# Patient Record
Sex: FEMALE | Race: BLACK OR AFRICAN AMERICAN | NOT HISPANIC OR LATINO | Employment: UNEMPLOYED | URBAN - METROPOLITAN AREA
[De-identification: names, ages, dates, MRNs, and addresses within clinical notes are randomized per-mention and may not be internally consistent; named-entity substitution may affect disease eponyms.]

---

## 2017-10-06 ENCOUNTER — HISTORICAL (OUTPATIENT)
Dept: ADMINISTRATIVE | Facility: HOSPITAL | Age: 32
End: 2017-10-06

## 2017-10-06 LAB
BILIRUB SERPL-MCNC: NEGATIVE MG/DL
BLOOD URINE, POC: NORMAL
CLARITY, POC UA: NORMAL
COLOR, POC UA: YELLOW
GLUCOSE UR QL STRIP: NEGATIVE
KETONES UR QL STRIP: NEGATIVE
LEUKOCYTE EST, POC UA: NORMAL
NITRITE, POC UA: NEGATIVE
PH, POC UA: 6
POC BETA-HCG (QUAL): NEGATIVE
PROTEIN, POC: NORMAL
SPECIFIC GRAVITY, POC UA: NORMAL
UROBILINOGEN, POC UA: NORMAL

## 2017-12-20 ENCOUNTER — NURSE TRIAGE (OUTPATIENT)
Dept: ADMINISTRATIVE | Facility: CLINIC | Age: 32
End: 2017-12-20

## 2017-12-20 NOTE — TELEPHONE ENCOUNTER
Reason for Disposition   [1] Caller requesting NON-URGENT health information AND [2] PCP's office is the best resource    Protocols used: ST INFORMATION ONLY CALL-A-AH    Pt mom states that pt started taking antibiotics on Saturday for pneumonia. States that pt is having chills and sweating but does not have fever. Pt PCP is not with [facility]. Advised pt mom to call pt PCP for care advice.

## 2018-01-04 ENCOUNTER — HISTORICAL (OUTPATIENT)
Dept: ADMINISTRATIVE | Facility: HOSPITAL | Age: 33
End: 2018-01-04

## 2018-02-06 ENCOUNTER — HISTORICAL (OUTPATIENT)
Dept: ADMINISTRATIVE | Facility: HOSPITAL | Age: 33
End: 2018-02-06

## 2018-02-06 LAB
ABS NEUT (OLG): 5.24 X10(3)/MCL (ref 2.1–9.2)
ALBUMIN SERPL-MCNC: 3.7 GM/DL (ref 3.4–5)
ALBUMIN/GLOB SERPL: 0.9 RATIO (ref 1.1–2)
ALP SERPL-CCNC: 120 UNIT/L (ref 38–126)
ALT SERPL-CCNC: 13 UNIT/L (ref 12–78)
AST SERPL-CCNC: 9 UNIT/L (ref 15–37)
BASOPHILS # BLD AUTO: 0 X10(3)/MCL (ref 0–0.2)
BASOPHILS NFR BLD AUTO: 0 %
BILIRUB SERPL-MCNC: 0.3 MG/DL (ref 0.2–1)
BILIRUBIN DIRECT+TOT PNL SERPL-MCNC: 0 MG/DL (ref 0–0.5)
BILIRUBIN DIRECT+TOT PNL SERPL-MCNC: 0.3 MG/DL (ref 0–0.8)
BUN SERPL-MCNC: 11 MG/DL (ref 7–18)
CALCIUM SERPL-MCNC: 9.1 MG/DL (ref 8.5–10.1)
CHLORIDE SERPL-SCNC: 103 MMOL/L (ref 98–107)
CHOLEST SERPL-MCNC: 201 MG/DL (ref 0–200)
CHOLEST/HDLC SERPL: 4.8 {RATIO} (ref 0–4)
CO2 SERPL-SCNC: 24 MMOL/L (ref 21–32)
CREAT SERPL-MCNC: 0.5 MG/DL (ref 0.55–1.02)
CREAT UR-MCNC: 199 MG/DL
EOSINOPHIL # BLD AUTO: 0.1 X10(3)/MCL (ref 0–0.9)
EOSINOPHIL NFR BLD AUTO: 1 %
ERYTHROCYTE [DISTWIDTH] IN BLOOD BY AUTOMATED COUNT: 16 % (ref 11.5–17)
EST. AVERAGE GLUCOSE BLD GHB EST-MCNC: 148 MG/DL
GLOBULIN SER-MCNC: 4 GM/DL (ref 2.4–3.5)
GLUCOSE SERPL-MCNC: 104 MG/DL (ref 74–106)
HBA1C MFR BLD: 6.8 % (ref 4.2–6.3)
HCT VFR BLD AUTO: 35.3 % (ref 37–47)
HDLC SERPL-MCNC: 42 MG/DL (ref 35–60)
HGB BLD-MCNC: 10.8 GM/DL (ref 12–16)
LDLC SERPL CALC-MCNC: 133 MG/DL (ref 0–129)
LYMPHOCYTES # BLD AUTO: 3.1 X10(3)/MCL (ref 0.6–4.6)
LYMPHOCYTES NFR BLD AUTO: 33 %
MCH RBC QN AUTO: 24.3 PG (ref 27–31)
MCHC RBC AUTO-ENTMCNC: 30.6 GM/DL (ref 33–36)
MCV RBC AUTO: 79.3 FL (ref 80–94)
MICROALBUMIN UR-MCNC: 10 MG/DL
MICROALBUMIN/CREAT RATIO PNL UR: 50.3 MG/GM CR (ref 0–30)
MONOCYTES # BLD AUTO: 0.9 X10(3)/MCL (ref 0.1–1.3)
MONOCYTES NFR BLD AUTO: 10 %
NEUTROPHILS # BLD AUTO: 5.24 X10(3)/MCL (ref 2.1–9.2)
NEUTROPHILS NFR BLD AUTO: 55 %
PLATELET # BLD AUTO: 464 X10(3)/MCL (ref 130–400)
PMV BLD AUTO: 8.6 FL (ref 9.4–12.4)
POTASSIUM SERPL-SCNC: 3.9 MMOL/L (ref 3.5–5.1)
PROT SERPL-MCNC: 7.7 GM/DL (ref 6.4–8.2)
RBC # BLD AUTO: 4.45 X10(6)/MCL (ref 4.2–5.4)
SODIUM SERPL-SCNC: 137 MMOL/L (ref 136–145)
T3FREE SERPL-MCNC: 2.57 PG/ML (ref 2.18–3.98)
T4 FREE SERPL-MCNC: 0.84 NG/DL (ref 0.76–1.46)
TRIGL SERPL-MCNC: 128 MG/DL (ref 30–150)
TSH SERPL-ACNC: 2.2 MIU/ML (ref 0.36–3.74)
VLDLC SERPL CALC-MCNC: 26 MG/DL
WBC # SPEC AUTO: 9.5 X10(3)/MCL (ref 4.5–11.5)

## 2018-05-14 ENCOUNTER — HISTORICAL (OUTPATIENT)
Dept: ADMINISTRATIVE | Facility: HOSPITAL | Age: 33
End: 2018-05-14

## 2018-05-14 LAB — HIV 1+2 AB+HIV1 P24 AG SERPL QL IA: NONREACTIVE

## 2018-06-12 ENCOUNTER — HISTORICAL (OUTPATIENT)
Dept: ADMINISTRATIVE | Facility: HOSPITAL | Age: 33
End: 2018-06-12

## 2018-06-12 LAB
ABS NEUT (OLG): 5.56 X10(3)/MCL (ref 2.1–9.2)
ALBUMIN SERPL-MCNC: 3.5 GM/DL (ref 3.4–5)
ALBUMIN/GLOB SERPL: 0.9 {RATIO}
ALP SERPL-CCNC: 118 UNIT/L (ref 38–126)
ALT SERPL-CCNC: 24 UNIT/L (ref 12–78)
AST SERPL-CCNC: 8 UNIT/L (ref 15–37)
BASOPHILS # BLD AUTO: 0 X10(3)/MCL (ref 0–0.2)
BASOPHILS NFR BLD AUTO: 0 %
BILIRUB SERPL-MCNC: 0.3 MG/DL (ref 0.2–1)
BILIRUBIN DIRECT+TOT PNL SERPL-MCNC: 0.1 MG/DL (ref 0–0.2)
BILIRUBIN DIRECT+TOT PNL SERPL-MCNC: 0.2 MG/DL (ref 0–0.8)
BUN SERPL-MCNC: 8 MG/DL (ref 7–18)
CALCIUM SERPL-MCNC: 8.9 MG/DL (ref 8.5–10.1)
CHLORIDE SERPL-SCNC: 107 MMOL/L (ref 98–107)
CHOLEST SERPL-MCNC: 174 MG/DL (ref 0–200)
CHOLEST/HDLC SERPL: 5.3 {RATIO} (ref 0–4)
CO2 SERPL-SCNC: 22 MMOL/L (ref 21–32)
CREAT SERPL-MCNC: 0.72 MG/DL (ref 0.55–1.02)
CREAT UR-MCNC: 210 MG/DL
EOSINOPHIL # BLD AUTO: 0.1 X10(3)/MCL (ref 0–0.9)
EOSINOPHIL NFR BLD AUTO: 2 %
ERYTHROCYTE [DISTWIDTH] IN BLOOD BY AUTOMATED COUNT: 16.5 % (ref 11.5–17)
EST. AVERAGE GLUCOSE BLD GHB EST-MCNC: 140 MG/DL
GLOBULIN SER-MCNC: 4 GM/DL (ref 2.4–3.5)
GLUCOSE SERPL-MCNC: 94 MG/DL (ref 74–106)
HBA1C MFR BLD: 6.5 % (ref 4.2–6.3)
HCT VFR BLD AUTO: 35.5 % (ref 37–47)
HDLC SERPL-MCNC: 33 MG/DL (ref 35–60)
HGB BLD-MCNC: 10.2 GM/DL (ref 12–16)
LDLC SERPL CALC-MCNC: 115 MG/DL (ref 0–129)
LYMPHOCYTES # BLD AUTO: 2.4 X10(3)/MCL (ref 0.6–4.6)
LYMPHOCYTES NFR BLD AUTO: 26 %
MCH RBC QN AUTO: 23.5 PG (ref 27–31)
MCHC RBC AUTO-ENTMCNC: 28.7 GM/DL (ref 33–36)
MCV RBC AUTO: 81.8 FL (ref 80–94)
MICROALBUMIN UR-MCNC: 2.6 MG/DL
MICROALBUMIN/CREAT RATIO PNL UR: 12.4 MG/GM CR (ref 0–30)
MONOCYTES # BLD AUTO: 0.8 X10(3)/MCL (ref 0.1–1.3)
MONOCYTES NFR BLD AUTO: 8 %
NEUTROPHILS # BLD AUTO: 5.56 X10(3)/MCL (ref 2.1–9.2)
NEUTROPHILS NFR BLD AUTO: 62 %
PLATELET # BLD AUTO: 565 X10(3)/MCL (ref 130–400)
PMV BLD AUTO: 8.8 FL (ref 9.4–12.4)
POTASSIUM SERPL-SCNC: 3.6 MMOL/L (ref 3.5–5.1)
PROT SERPL-MCNC: 7.5 GM/DL (ref 6.4–8.2)
RBC # BLD AUTO: 4.34 X10(6)/MCL (ref 4.2–5.4)
SODIUM SERPL-SCNC: 140 MMOL/L (ref 136–145)
TRIGL SERPL-MCNC: 131 MG/DL (ref 30–150)
VIT B12 SERPL-MCNC: 722 PG/ML (ref 193–986)
VLDLC SERPL CALC-MCNC: 26 MG/DL
WBC # SPEC AUTO: 8.9 X10(3)/MCL (ref 4.5–11.5)

## 2018-08-13 ENCOUNTER — HISTORICAL (OUTPATIENT)
Dept: ADMINISTRATIVE | Facility: HOSPITAL | Age: 33
End: 2018-08-13

## 2018-08-13 LAB
ABS NEUT (OLG): 4.4 X10(3)/MCL (ref 2.1–9.2)
ALBUMIN SERPL-MCNC: 4 GM/DL (ref 3.4–5)
ALBUMIN/GLOB SERPL: 1 RATIO (ref 1.1–2)
ALP SERPL-CCNC: 124 UNIT/L (ref 38–126)
ALT SERPL-CCNC: 23 UNIT/L (ref 12–78)
ANISOCYTOSIS BLD QL SMEAR: 1
APPEARANCE, UA: CLEAR
AST SERPL-CCNC: 14 UNIT/L (ref 15–37)
BACTERIA #/AREA URNS AUTO: ABNORMAL /HPF
BASOPHILS # BLD AUTO: 0 X10(3)/MCL (ref 0–0.2)
BASOPHILS NFR BLD AUTO: 0 %
BILIRUB SERPL-MCNC: 0.2 MG/DL (ref 0.2–1)
BILIRUB UR QL STRIP: NEGATIVE
BILIRUBIN DIRECT+TOT PNL SERPL-MCNC: 0.1 MG/DL (ref 0–0.5)
BILIRUBIN DIRECT+TOT PNL SERPL-MCNC: 0.1 MG/DL (ref 0–0.8)
BUN SERPL-MCNC: 8 MG/DL (ref 7–18)
CALCIUM SERPL-MCNC: 9.3 MG/DL (ref 8.5–10.1)
CHLORIDE SERPL-SCNC: 103 MMOL/L (ref 98–107)
CHOLEST SERPL-MCNC: 200 MG/DL (ref 0–200)
CHOLEST/HDLC SERPL: 5.3 {RATIO} (ref 0–4)
CO2 SERPL-SCNC: 23 MMOL/L (ref 21–32)
COLOR UR: ABNORMAL
CREAT SERPL-MCNC: 0.82 MG/DL (ref 0.55–1.02)
EOSINOPHIL # BLD AUTO: 0 X10(3)/MCL (ref 0–0.9)
EOSINOPHIL NFR BLD AUTO: 0 %
ERYTHROCYTE [DISTWIDTH] IN BLOOD BY AUTOMATED COUNT: 16.8 % (ref 11.5–17)
GLOBULIN SER-MCNC: 4.2 GM/DL (ref 2.4–3.5)
GLUCOSE (UA): NEGATIVE
GLUCOSE SERPL-MCNC: 115 MG/DL (ref 74–106)
HCT VFR BLD AUTO: 36.6 % (ref 37–47)
HDLC SERPL-MCNC: 38 MG/DL (ref 35–60)
HGB BLD-MCNC: 10.7 GM/DL (ref 12–16)
HGB UR QL STRIP: NEGATIVE
KETONES UR QL STRIP: ABNORMAL
LDLC SERPL CALC-MCNC: 137 MG/DL (ref 0–129)
LEUKOCYTE ESTERASE UR QL STRIP: NEGATIVE
LYMPHOCYTES # BLD AUTO: 2.7 X10(3)/MCL (ref 0.6–4.6)
LYMPHOCYTES NFR BLD AUTO: 33 % (ref 13–40)
MCH RBC QN AUTO: 22.1 PG (ref 27–31)
MCHC RBC AUTO-ENTMCNC: 29.2 GM/DL (ref 33–36)
MCV RBC AUTO: 75.5 FL (ref 80–94)
MONOCYTES # BLD AUTO: 1 X10(3)/MCL (ref 0.1–1.3)
MONOCYTES NFR BLD AUTO: 12 %
NEUTROPHILS # BLD AUTO: 4.4 X10(3)/MCL (ref 2.1–9.2)
NEUTROPHILS NFR BLD AUTO: 54 %
NITRITE UR QL STRIP.AUTO: NEGATIVE
PH UR STRIP: 6 [PH] (ref 5–9)
PLATELET # BLD AUTO: 620 X10(3)/MCL (ref 130–400)
PLATELET # BLD EST: NORMAL 10*3/UL
PMV BLD AUTO: 8.6 FL (ref 7.4–10.4)
POTASSIUM SERPL-SCNC: 3.5 MMOL/L (ref 3.5–5.1)
PROT SERPL-MCNC: 8.2 GM/DL (ref 6.4–8.2)
PROT UR QL STRIP: ABNORMAL
RBC # BLD AUTO: 4.85 X10(6)/MCL (ref 4.2–5.4)
RBC #/AREA URNS HPF: ABNORMAL /[HPF]
RHEUMATOID FACT SERPL-ACNC: <10 IU/ML (ref 0–15)
SODIUM SERPL-SCNC: 140 MMOL/L (ref 136–145)
SP GR UR STRIP: 1.03 (ref 1–1.03)
SQUAMOUS EPITHELIAL, UA: 10 /HPF (ref 0–4)
TRIGL SERPL-MCNC: 125 MG/DL (ref 30–150)
UROBILINOGEN UR STRIP-ACNC: 1
VLDLC SERPL CALC-MCNC: 25 MG/DL
WBC # SPEC AUTO: 8.2 X10(3)/MCL (ref 4.5–11.5)
WBC #/AREA URNS AUTO: ABNORMAL /HPF (ref 0–3)

## 2018-11-01 ENCOUNTER — HISTORICAL (OUTPATIENT)
Dept: RADIOLOGY | Facility: HOSPITAL | Age: 33
End: 2018-11-01

## 2018-12-07 ENCOUNTER — TELEPHONE (OUTPATIENT)
Dept: OTOLARYNGOLOGY | Facility: CLINIC | Age: 33
End: 2018-12-07

## 2018-12-07 NOTE — TELEPHONE ENCOUNTER
----- Message from Lorraine Edwards sent at 12/7/2018  3:24 PM CST -----  Contact: self  Pt is calling in regards to rescheduling her appt that was missed on 11/30. Books aren't currently open to schedule this appt. Pt would like a call back to do so.    She can be reached at [phone].    Thank you

## 2018-12-20 ENCOUNTER — HISTORICAL (OUTPATIENT)
Dept: ADMINISTRATIVE | Facility: HOSPITAL | Age: 33
End: 2018-12-20

## 2019-01-03 ENCOUNTER — HOSPITAL ENCOUNTER (OUTPATIENT)
Dept: MEDSURG UNIT | Facility: HOSPITAL | Age: 34
End: 2019-01-05
Attending: SURGERY | Admitting: SURGERY

## 2019-01-04 LAB
ABS NEUT (OLG): 8.68 X10(3)/MCL (ref 2.1–9.2)
ALBUMIN SERPL-MCNC: 3.6 GM/DL (ref 3.4–5)
ALBUMIN/GLOB SERPL: 0.8 {RATIO}
ALP SERPL-CCNC: 123 UNIT/L (ref 38–126)
ALT SERPL-CCNC: 19 UNIT/L (ref 12–78)
ANISOCYTOSIS BLD QL SMEAR: 1
APPEARANCE, UA: ABNORMAL
AST SERPL-CCNC: 9 UNIT/L (ref 15–37)
BACTERIA SPEC CULT: ABNORMAL /HPF
BILIRUB SERPL-MCNC: 0.5 MG/DL (ref 0.2–1)
BILIRUB UR QL STRIP: NEGATIVE
BILIRUBIN DIRECT+TOT PNL SERPL-MCNC: 0.1 MG/DL (ref 0–0.2)
BILIRUBIN DIRECT+TOT PNL SERPL-MCNC: 0.4 MG/DL (ref 0–0.8)
BUN SERPL-MCNC: 10 MG/DL (ref 7–18)
BURR CELLS BLD QL SMEAR: 1
CALCIUM SERPL-MCNC: 8.8 MG/DL (ref 8.5–10.1)
CHLORIDE SERPL-SCNC: 102 MMOL/L (ref 98–107)
CO2 SERPL-SCNC: 25 MMOL/L (ref 21–32)
COLOR UR: YELLOW
CREAT SERPL-MCNC: 0.72 MG/DL (ref 0.55–1.02)
EOSINOPHIL NFR BLD MANUAL: 3 % (ref 0–8)
ERYTHROCYTE [DISTWIDTH] IN BLOOD BY AUTOMATED COUNT: 17.5 % (ref 11.5–17)
GLOBULIN SER-MCNC: 4.7 GM/DL (ref 2.4–3.5)
GLUCOSE (UA): NEGATIVE
GLUCOSE SERPL-MCNC: 97 MG/DL (ref 74–106)
HCT VFR BLD AUTO: 39.2 % (ref 37–47)
HGB BLD-MCNC: 11.4 GM/DL (ref 12–16)
HGB UR QL STRIP: NEGATIVE
HYPOCHROMIA BLD QL SMEAR: 0
KETONES UR QL STRIP: NEGATIVE
LACTATE SERPL-SCNC: 0.6 MMOL/L (ref 0.4–2)
LEUKOCYTE ESTERASE UR QL STRIP: NEGATIVE
LIPASE SERPL-CCNC: 192 UNIT/L (ref 73–393)
LYMPHOCYTES NFR BLD MANUAL: 33 % (ref 13–40)
MACROCYTES BLD QL SMEAR: 0
MCH RBC QN AUTO: 21.3 PG (ref 27–31)
MCHC RBC AUTO-ENTMCNC: 29.1 GM/DL (ref 33–36)
MCV RBC AUTO: 73.3 FL (ref 80–94)
MICROCYTES BLD QL SMEAR: 1
MONOCYTES NFR BLD MANUAL: 7 % (ref 2–11)
NEUTROPHILS NFR BLD MANUAL: 57 % (ref 47–80)
NITRITE UR QL STRIP: NEGATIVE
PH UR STRIP: 6.5 [PH] (ref 5–9)
PLATELET # BLD AUTO: 614 X10(3)/MCL (ref 130–400)
PLATELET # BLD EST: ABNORMAL 10*3/UL
PMV BLD AUTO: 8.6 FL (ref 7.4–10.4)
POIKILOCYTOSIS BLD QL SMEAR: 1
POLYCHROMASIA BLD QL SMEAR: 0
POTASSIUM SERPL-SCNC: 3.3 MMOL/L (ref 3.5–5.1)
PROT SERPL-MCNC: 8.3 GM/DL (ref 6.4–8.2)
PROT UR QL STRIP: ABNORMAL
RBC # BLD AUTO: 5.35 X10(6)/MCL (ref 4.2–5.4)
RBC #/AREA URNS HPF: ABNORMAL /[HPF]
SODIUM SERPL-SCNC: 135 MMOL/L (ref 136–145)
SP GR UR STRIP: 1.03 (ref 1–1.03)
SQUAMOUS EPITHELIAL, UA: 6 /HPF (ref 0–4)
UROBILINOGEN UR STRIP-ACNC: 1
WBC # SPEC AUTO: 13.8 X10(3)/MCL (ref 4.5–11.5)
WBC #/AREA URNS HPF: ABNORMAL /[HPF]

## 2019-01-05 LAB
ABS NEUT (OLG): 5.61 X10(3)/MCL (ref 2.1–9.2)
ALBUMIN SERPL-MCNC: 2.6 GM/DL (ref 3.4–5)
ALBUMIN/GLOB SERPL: 0.6 RATIO (ref 1.1–2)
ALP SERPL-CCNC: 104 UNIT/L (ref 38–126)
ALT SERPL-CCNC: 14 UNIT/L (ref 12–78)
AST SERPL-CCNC: 10 UNIT/L (ref 15–37)
BASOPHILS # BLD AUTO: 0 X10(3)/MCL (ref 0–0.2)
BASOPHILS NFR BLD AUTO: 0 %
BILIRUB SERPL-MCNC: 0.2 MG/DL (ref 0.2–1)
BILIRUBIN DIRECT+TOT PNL SERPL-MCNC: 0 MG/DL (ref 0–0.5)
BILIRUBIN DIRECT+TOT PNL SERPL-MCNC: 0.2 MG/DL (ref 0–0.8)
BUN SERPL-MCNC: 4 MG/DL (ref 7–18)
CALCIUM SERPL-MCNC: 8.4 MG/DL (ref 8.5–10.1)
CHLORIDE SERPL-SCNC: 105 MMOL/L (ref 98–107)
CO2 SERPL-SCNC: 23 MMOL/L (ref 21–32)
CREAT SERPL-MCNC: 0.55 MG/DL (ref 0.55–1.02)
EOSINOPHIL # BLD AUTO: 0.1 X10(3)/MCL (ref 0–0.9)
EOSINOPHIL NFR BLD AUTO: 1 %
ERYTHROCYTE [DISTWIDTH] IN BLOOD BY AUTOMATED COUNT: 17.6 % (ref 11.5–17)
GLOBULIN SER-MCNC: 4.2 GM/DL (ref 2.4–3.5)
GLUCOSE SERPL-MCNC: 103 MG/DL (ref 74–106)
HCT VFR BLD AUTO: 32.3 % (ref 37–47)
HGB BLD-MCNC: 9.3 GM/DL (ref 12–16)
LYMPHOCYTES # BLD AUTO: 3.2 X10(3)/MCL (ref 0.6–4.6)
LYMPHOCYTES NFR BLD AUTO: 32 %
MCH RBC QN AUTO: 21.3 PG (ref 27–31)
MCHC RBC AUTO-ENTMCNC: 28.8 GM/DL (ref 33–36)
MCV RBC AUTO: 73.9 FL (ref 80–94)
MONOCYTES # BLD AUTO: 1.1 X10(3)/MCL (ref 0.1–1.3)
MONOCYTES NFR BLD AUTO: 11 %
NEUTROPHILS # BLD AUTO: 5.61 X10(3)/MCL (ref 2.1–9.2)
NEUTROPHILS NFR BLD AUTO: 56 %
PLATELET # BLD AUTO: 488 X10(3)/MCL (ref 130–400)
PMV BLD AUTO: 8.7 FL (ref 9.4–12.4)
POTASSIUM SERPL-SCNC: 3.2 MMOL/L (ref 3.5–5.1)
PROT SERPL-MCNC: 6.8 GM/DL (ref 6.4–8.2)
RBC # BLD AUTO: 4.37 X10(6)/MCL (ref 4.2–5.4)
SODIUM SERPL-SCNC: 137 MMOL/L (ref 136–145)
WBC # SPEC AUTO: 10 X10(3)/MCL (ref 4.5–11.5)

## 2019-01-21 ENCOUNTER — OFFICE VISIT (OUTPATIENT)
Dept: OTOLARYNGOLOGY | Facility: CLINIC | Age: 34
End: 2019-01-21
Payer: MEDICAID

## 2019-01-21 VITALS
WEIGHT: 197.56 LBS | SYSTOLIC BLOOD PRESSURE: 139 MMHG | DIASTOLIC BLOOD PRESSURE: 85 MMHG | BODY MASS INDEX: 36.35 KG/M2 | HEART RATE: 118 BPM | HEIGHT: 62 IN

## 2019-01-21 DIAGNOSIS — K21.9 LPRD (LARYNGOPHARYNGEAL REFLUX DISEASE): Primary | ICD-10-CM

## 2019-01-21 PROCEDURE — 31575 PR LARYNGOSCOPY, FLEXIBLE; DIAGNOSTIC: ICD-10-PCS | Mod: S$PBB,,, | Performed by: OTOLARYNGOLOGY

## 2019-01-21 PROCEDURE — 99213 OFFICE O/P EST LOW 20 MIN: CPT | Mod: PBBFAC | Performed by: OTOLARYNGOLOGY

## 2019-01-21 PROCEDURE — 99204 OFFICE O/P NEW MOD 45 MIN: CPT | Mod: 25,S$PBB,, | Performed by: OTOLARYNGOLOGY

## 2019-01-21 PROCEDURE — 31575 DIAGNOSTIC LARYNGOSCOPY: CPT | Mod: S$PBB,,, | Performed by: OTOLARYNGOLOGY

## 2019-01-21 PROCEDURE — 99204 PR OFFICE/OUTPT VISIT, NEW, LEVL IV, 45-59 MIN: ICD-10-PCS | Mod: 25,S$PBB,, | Performed by: OTOLARYNGOLOGY

## 2019-01-21 PROCEDURE — 99999 PR PBB SHADOW E&M-EST. PATIENT-LVL III: CPT | Mod: PBBFAC,,, | Performed by: OTOLARYNGOLOGY

## 2019-01-21 PROCEDURE — 99999 PR PBB SHADOW E&M-EST. PATIENT-LVL III: ICD-10-PCS | Mod: PBBFAC,,, | Performed by: OTOLARYNGOLOGY

## 2019-01-21 PROCEDURE — 31575 DIAGNOSTIC LARYNGOSCOPY: CPT | Mod: PBBFAC | Performed by: OTOLARYNGOLOGY

## 2019-01-22 NOTE — CONSULTS
Ms. Glass presents for consultation.    VITAL SIGNS:  Per nurses' notes.    CHIEF COMPLAINT:  Breathing issues.    HISTORY OF PRESENT ILLNESS:  This is a 33-year-old black female who states she   has restrictive lung disease and a positive globus type sensation.  She states   she occasionally has some blood in the morning when she blows her nose.  She   denies any nasal obstruction.    REVIEW OF SYSTEMS:  CONSTITUTIONAL: Weight loss or weight gain: Negative.  ALLERGY/IMMUNOLOGIC: Negative.  ENT/Mouth:  Hearing Loss/Dizziness/Tinnitus: Negative.  Ear Infections/Otalgia: Negative.  Rhinitis/Sinusitis/Epistaxis: Negative.  Headache/Facial Pain: Negative.  Nasal Obstruction/Snoring/SAIDA: Negative.  Throat: Infections/Pain: Negative.  Hoarseness/Speech Disturbance: Negative.  Salivary Glands Disorder: Negative.  Trauma: Hx: Negative.    Cardiovascular:  MI/Angina: Negative.  Hypertension: Negative.  Endo: DM/Steroids: Negative.  Eyes: Negative.  GI: Dysphagia/Reflux: Negative.  : GYN Pregnancy: Negative.      Renal: Dialysis: Negative.  Lymph: Neck Mass/Lymphadenopathy: Negative.  Musculoskeletal: Negative.  Hem: Bleeding Disorders/Anemia: Negative.  Neuro: Cranial/Neuralgia: Negative.  Pulm: Asthma/SOB/Cough: Negative.  Skin/Breast: Negative.    PAST MEDICAL/FAMILY/SOCIAL HISTORY:    Past Medical History   ENT Surgery: Negative.   Occupational Exposure: Negative.    Problems: Negative.   Cancer: Negative.   Positive for bipolar I disorder, COPD, diabetes and GERD.  Surgical history   positive for hip surgery, cholecystectomy, .    Past Family History   Family history hearing loss: Negative.   Family history cancer: Negative.    Past Social History   Tobacco: Nonsmoker.   Alcohol: Nondrinker.    MEDICATIONS:  Per MedCard.    ALLERGIES:  Per MedCard.    EXAMINATION:  General Appearance:  Well-developed, well-nourished, heavyset 33-year-old black   female in no apparent distress.  Communication  Ability:  Good.  EARS, NOSE, THROAT, MOUTH;  EARS:   External auditory canals: Show cerumen impaction.   Hearing: Grossly Intact.   Tympanic membranes: Clear.  NOSE:   External: Normal.  Good valvular support.   Intranasal: Septum is straight.  Her mucosa is slightly dry.  No areas of   obvious bleeding here.  Turbinates 1+.  Airway clear.  MOUTH:   Intraorally: Lips, teeth and gums: Normal.   Oropharynx: Normal.   Mucosa: Normal.  THROAT:   Tongue: Normal.   Palate: Normal.   Tonsils: 1-2+.   Posterior pharynx: Normal.  HEAD/FACE INSPECTION: Normal and atraumatic.   Palpation/Percussion:  Non tender.   Facial Strength: Normal and symmetric.   Salivary glands: Normal.    NECK: Supple.  THYROID: No masses.  LYMPHATICS: No nodes.  RESPIRATORY:   Effort: Normal.  EYES:   Ocular Mobility: Normal.   Vision: Grossly intact.  NEURO/PSYCH:   Cranial nerves: 2-12 grossly intact.   Orientation: x3.   Mood/Affect: Normal.    On flexible fiberoptic exam with scope #[id number], she shows no polyps, masses or   other lesions intranasally.  Epiglottis is normal.  Aryepiglottic folds do show   erythema and edema consistent with LPR.  Her vocal cords are normal with normal   mobility.  No masses or lesions.  She is nontender to palpation.    IMPRESSION:  A 33-year-old black female with diagnosis of chronic obstructive   lung disease, also with GERD and laryngopharyngeal reflux.    RECOMMENDATION:  I have discussed my findings with her in detail as well as my   recommendations for treatment.  I have given her reflux precautions on written   information.  I have also suggested GI consultation.  She will continue with her   Prilosec and return to clinic here p.r.n.      HG/HN  dd: 01/21/2019 14:45:32 (CST)  td: 01/21/2019 23:40:26 (CST)  Doc ID   #[id number]  Job ID #[id number]    CC:

## 2019-05-07 PROBLEM — E61.1 IRON DEFICIENCY: Status: ACTIVE | Noted: 2019-05-07

## 2019-06-04 LAB
HPV, HIGH-RISK: POSITIVE
HPV, HIGH-RISK: POSITIVE

## 2019-08-28 ENCOUNTER — HISTORICAL (OUTPATIENT)
Dept: ADMINISTRATIVE | Facility: HOSPITAL | Age: 34
End: 2019-08-28

## 2019-10-30 ENCOUNTER — HISTORICAL (OUTPATIENT)
Dept: ADMINISTRATIVE | Facility: HOSPITAL | Age: 34
End: 2019-10-30

## 2019-12-04 ENCOUNTER — HISTORICAL (OUTPATIENT)
Dept: ADMINISTRATIVE | Facility: HOSPITAL | Age: 34
End: 2019-12-04

## 2021-03-19 ENCOUNTER — IMMUNIZATION (OUTPATIENT)
Dept: FAMILY MEDICINE | Facility: CLINIC | Age: 36
End: 2021-03-19

## 2021-03-19 DIAGNOSIS — Z23 NEED FOR VACCINATION: Primary | ICD-10-CM

## 2021-03-19 PROCEDURE — 0001A COVID-19, MRNA, LNP-S, PF, 30 MCG/0.3 ML DOSE VACCINE: CPT | Mod: CV19,S$GLB,, | Performed by: FAMILY MEDICINE

## 2021-03-19 PROCEDURE — 91300 COVID-19, MRNA, LNP-S, PF, 30 MCG/0.3 ML DOSE VACCINE: ICD-10-PCS | Mod: S$GLB,,, | Performed by: FAMILY MEDICINE

## 2021-03-19 PROCEDURE — 91300 COVID-19, MRNA, LNP-S, PF, 30 MCG/0.3 ML DOSE VACCINE: CPT | Mod: S$GLB,,, | Performed by: FAMILY MEDICINE

## 2021-03-19 PROCEDURE — 0001A COVID-19, MRNA, LNP-S, PF, 30 MCG/0.3 ML DOSE VACCINE: ICD-10-PCS | Mod: CV19,S$GLB,, | Performed by: FAMILY MEDICINE

## 2021-03-22 LAB
HCV AB SERPL QL IA: NEGATIVE
HIV AB/AG COMBO RFLX CONF: NON REACTIVE

## 2021-03-24 ENCOUNTER — HOSPITAL ENCOUNTER (EMERGENCY)
Facility: HOSPITAL | Age: 36
Discharge: HOME | End: 2021-03-24
Attending: SURGERY
Payer: MEDICAID

## 2021-03-24 VITALS
WEIGHT: 188.06 LBS | OXYGEN SATURATION: 98 % | DIASTOLIC BLOOD PRESSURE: 73 MMHG | HEART RATE: 106 BPM | RESPIRATION RATE: 18 BRPM | BODY MASS INDEX: 34.4 KG/M2 | TEMPERATURE: 99 F | SYSTOLIC BLOOD PRESSURE: 118 MMHG

## 2021-03-24 DIAGNOSIS — F43.9 STRESS: ICD-10-CM

## 2021-03-24 DIAGNOSIS — R07.89 ATYPICAL CHEST PAIN: Primary | ICD-10-CM

## 2021-03-24 LAB
ALBUMIN SERPL BCP-MCNC: 3.3 G/DL (ref 3.5–5.2)
ALP SERPL-CCNC: 81 U/L (ref 55–135)
ALT SERPL W/O P-5'-P-CCNC: 12 U/L (ref 10–44)
AMPHET+METHAMPHET UR QL: NEGATIVE
ANION GAP SERPL CALC-SCNC: 9 MMOL/L (ref 8–16)
APTT BLDCRRT: 27.1 SEC (ref 21–32)
AST SERPL-CCNC: 8 U/L (ref 10–40)
B-HCG UR QL: NEGATIVE
BARBITURATES UR QL SCN>200 NG/ML: NEGATIVE
BASOPHILS # BLD AUTO: 0.06 K/UL (ref 0–0.2)
BASOPHILS NFR BLD: 0.6 % (ref 0–1.9)
BENZODIAZ UR QL SCN>200 NG/ML: NORMAL
BILIRUB SERPL-MCNC: 0.1 MG/DL (ref 0.1–1)
BILIRUB UR QL STRIP: NEGATIVE
BNP SERPL-MCNC: 10 PG/ML (ref 0–99)
BUN SERPL-MCNC: 10 MG/DL (ref 6–20)
BZE UR QL SCN: NEGATIVE
CALCIUM SERPL-MCNC: 8.9 MG/DL (ref 8.7–10.5)
CANNABINOIDS UR QL SCN: NEGATIVE
CHLORIDE SERPL-SCNC: 107 MMOL/L (ref 95–110)
CK MB SERPL-MCNC: 0.9 NG/ML (ref 0.1–6.5)
CK MB SERPL-RTO: 1.4 % (ref 0–5)
CK SERPL-CCNC: 66 U/L (ref 20–180)
CK SERPL-CCNC: 66 U/L (ref 20–180)
CLARITY UR: CLEAR
CO2 SERPL-SCNC: 22 MMOL/L (ref 23–29)
COLOR UR: YELLOW
CREAT SERPL-MCNC: 0.6 MG/DL (ref 0.5–1.4)
CREAT UR-MCNC: 85.5 MG/DL (ref 15–325)
D DIMER PPP IA.FEU-MCNC: 0.84 MG/L FEU
DIFFERENTIAL METHOD: ABNORMAL
EOSINOPHIL # BLD AUTO: 0.1 K/UL (ref 0–0.5)
EOSINOPHIL NFR BLD: 1.4 % (ref 0–8)
ERYTHROCYTE [DISTWIDTH] IN BLOOD BY AUTOMATED COUNT: 14.7 % (ref 11.5–14.5)
EST. GFR  (AFRICAN AMERICAN): >60 ML/MIN/1.73 M^2
EST. GFR  (NON AFRICAN AMERICAN): >60 ML/MIN/1.73 M^2
GLUCOSE SERPL-MCNC: 95 MG/DL (ref 70–110)
GLUCOSE UR QL STRIP: NEGATIVE
HCT VFR BLD AUTO: 36.1 % (ref 37–48.5)
HGB BLD-MCNC: 11 G/DL (ref 12–16)
HGB UR QL STRIP: NEGATIVE
IMM GRANULOCYTES # BLD AUTO: 0.1 K/UL (ref 0–0.04)
IMM GRANULOCYTES NFR BLD AUTO: 1 % (ref 0–0.5)
INR PPP: 0.9 (ref 0.8–1.2)
KETONES UR QL STRIP: NEGATIVE
LEUKOCYTE ESTERASE UR QL STRIP: NEGATIVE
LYMPHOCYTES # BLD AUTO: 2.7 K/UL (ref 1–4.8)
LYMPHOCYTES NFR BLD: 26.5 % (ref 18–48)
MCH RBC QN AUTO: 25.2 PG (ref 27–31)
MCHC RBC AUTO-ENTMCNC: 30.5 G/DL (ref 32–36)
MCV RBC AUTO: 83 FL (ref 82–98)
METHADONE UR QL SCN>300 NG/ML: NEGATIVE
MONOCYTES # BLD AUTO: 1.1 K/UL (ref 0.3–1)
MONOCYTES NFR BLD: 10.6 % (ref 4–15)
NEUTROPHILS # BLD AUTO: 6.1 K/UL (ref 1.8–7.7)
NEUTROPHILS NFR BLD: 59.9 % (ref 38–73)
NITRITE UR QL STRIP: NEGATIVE
NRBC BLD-RTO: 0 /100 WBC
OPIATES UR QL SCN: NEGATIVE
PCP UR QL SCN>25 NG/ML: NEGATIVE
PH UR STRIP: 7 [PH] (ref 5–8)
PLATELET # BLD AUTO: 504 K/UL (ref 150–350)
PMV BLD AUTO: 8.9 FL (ref 9.2–12.9)
POTASSIUM SERPL-SCNC: 4.1 MMOL/L (ref 3.5–5.1)
PROT SERPL-MCNC: 7.5 G/DL (ref 6–8.4)
PROT UR QL STRIP: NEGATIVE
PROTHROMBIN TIME: 9.9 SEC (ref 9–12.5)
RBC # BLD AUTO: 4.36 M/UL (ref 4–5.4)
SODIUM SERPL-SCNC: 138 MMOL/L (ref 136–145)
SP GR UR STRIP: >=1.03 (ref 1–1.03)
TOXICOLOGY INFORMATION: NORMAL
TROPONIN I SERPL DL<=0.01 NG/ML-MCNC: <0.006 NG/ML (ref 0–0.03)
URN SPEC COLLECT METH UR: ABNORMAL
UROBILINOGEN UR STRIP-ACNC: NEGATIVE EU/DL
WBC # BLD AUTO: 10.19 K/UL (ref 3.9–12.7)

## 2021-03-24 PROCEDURE — 85730 THROMBOPLASTIN TIME PARTIAL: CPT | Performed by: SURGERY

## 2021-03-24 PROCEDURE — 85025 COMPLETE CBC W/AUTO DIFF WBC: CPT | Performed by: SURGERY

## 2021-03-24 PROCEDURE — 83880 ASSAY OF NATRIURETIC PEPTIDE: CPT | Performed by: SURGERY

## 2021-03-24 PROCEDURE — 25000003 PHARM REV CODE 250: Performed by: SURGERY

## 2021-03-24 PROCEDURE — 82553 CREATINE MB FRACTION: CPT | Performed by: SURGERY

## 2021-03-24 PROCEDURE — 81003 URINALYSIS AUTO W/O SCOPE: CPT | Mod: 59 | Performed by: SURGERY

## 2021-03-24 PROCEDURE — 93010 EKG 12-LEAD: ICD-10-PCS | Mod: ,,, | Performed by: INTERNAL MEDICINE

## 2021-03-24 PROCEDURE — 85379 FIBRIN DEGRADATION QUANT: CPT | Performed by: SURGERY

## 2021-03-24 PROCEDURE — 99285 EMERGENCY DEPT VISIT HI MDM: CPT | Mod: 25

## 2021-03-24 PROCEDURE — 93010 ELECTROCARDIOGRAM REPORT: CPT | Mod: ,,, | Performed by: INTERNAL MEDICINE

## 2021-03-24 PROCEDURE — 96360 HYDRATION IV INFUSION INIT: CPT

## 2021-03-24 PROCEDURE — 80053 COMPREHEN METABOLIC PANEL: CPT | Performed by: SURGERY

## 2021-03-24 PROCEDURE — 93005 ELECTROCARDIOGRAM TRACING: CPT

## 2021-03-24 PROCEDURE — 81025 URINE PREGNANCY TEST: CPT | Performed by: SURGERY

## 2021-03-24 PROCEDURE — 80307 DRUG TEST PRSMV CHEM ANLYZR: CPT | Performed by: SURGERY

## 2021-03-24 PROCEDURE — 85610 PROTHROMBIN TIME: CPT | Performed by: SURGERY

## 2021-03-24 PROCEDURE — 82550 ASSAY OF CK (CPK): CPT | Performed by: SURGERY

## 2021-03-24 PROCEDURE — 84484 ASSAY OF TROPONIN QUANT: CPT | Performed by: SURGERY

## 2021-03-24 PROCEDURE — 25000003 PHARM REV CODE 250: Performed by: FAMILY MEDICINE

## 2021-03-24 PROCEDURE — 25500020 PHARM REV CODE 255: Performed by: FAMILY MEDICINE

## 2021-03-24 PROCEDURE — 36415 COLL VENOUS BLD VENIPUNCTURE: CPT | Performed by: SURGERY

## 2021-03-24 RX ORDER — CYCLOBENZAPRINE HCL 10 MG
10 TABLET ORAL 3 TIMES DAILY PRN
Qty: 15 TABLET | Refills: 0 | Status: SHIPPED | OUTPATIENT
Start: 2021-03-24 | End: 2021-06-16 | Stop reason: SDUPTHER

## 2021-03-24 RX ORDER — CYCLOBENZAPRINE HCL 10 MG
10 TABLET ORAL
Status: COMPLETED | OUTPATIENT
Start: 2021-03-24 | End: 2021-03-24

## 2021-03-24 RX ORDER — TRAMADOL HYDROCHLORIDE 50 MG/1
50 TABLET ORAL EVERY 6 HOURS PRN
Qty: 12 TABLET | Refills: 0 | Status: SHIPPED | OUTPATIENT
Start: 2021-03-24 | End: 2022-11-03

## 2021-03-24 RX ORDER — TRAMADOL HYDROCHLORIDE 50 MG/1
50 TABLET ORAL
Status: COMPLETED | OUTPATIENT
Start: 2021-03-24 | End: 2021-03-24

## 2021-03-24 RX ORDER — ASPIRIN 325 MG
325 TABLET ORAL
Status: COMPLETED | OUTPATIENT
Start: 2021-03-24 | End: 2021-03-24

## 2021-03-24 RX ADMIN — CYCLOBENZAPRINE 10 MG: 10 TABLET, FILM COATED ORAL at 06:03

## 2021-03-24 RX ADMIN — ASPIRIN 325 MG: 325 TABLET ORAL at 05:03

## 2021-03-24 RX ADMIN — IOHEXOL 75 ML: 350 INJECTION, SOLUTION INTRAVENOUS at 06:03

## 2021-03-24 RX ADMIN — TRAMADOL HYDROCHLORIDE 50 MG: 50 TABLET ORAL at 06:03

## 2021-03-24 RX ADMIN — SODIUM CHLORIDE 500 ML: 0.9 INJECTION, SOLUTION INTRAVENOUS at 05:03

## 2021-03-26 DIAGNOSIS — R07.89 CHEST WALL PAIN: Primary | ICD-10-CM

## 2021-04-09 ENCOUNTER — IMMUNIZATION (OUTPATIENT)
Dept: FAMILY MEDICINE | Facility: CLINIC | Age: 36
End: 2021-04-09

## 2021-04-09 DIAGNOSIS — Z23 NEED FOR VACCINATION: Primary | ICD-10-CM

## 2021-04-09 PROCEDURE — 91300 COVID-19, MRNA, LNP-S, PF, 30 MCG/0.3 ML DOSE VACCINE: CPT | Mod: S$GLB,,, | Performed by: FAMILY MEDICINE

## 2021-04-09 PROCEDURE — 0002A COVID-19, MRNA, LNP-S, PF, 30 MCG/0.3 ML DOSE VACCINE: CPT | Mod: CV19,S$GLB,, | Performed by: FAMILY MEDICINE

## 2021-04-09 PROCEDURE — 0002A COVID-19, MRNA, LNP-S, PF, 30 MCG/0.3 ML DOSE VACCINE: ICD-10-PCS | Mod: CV19,S$GLB,, | Performed by: FAMILY MEDICINE

## 2021-04-09 PROCEDURE — 91300 COVID-19, MRNA, LNP-S, PF, 30 MCG/0.3 ML DOSE VACCINE: ICD-10-PCS | Mod: S$GLB,,, | Performed by: FAMILY MEDICINE

## 2021-05-05 ENCOUNTER — OFFICE VISIT (OUTPATIENT)
Dept: FAMILY MEDICINE | Facility: CLINIC | Age: 36
End: 2021-05-05
Payer: MEDICAID

## 2021-05-05 VITALS
DIASTOLIC BLOOD PRESSURE: 81 MMHG | HEIGHT: 62 IN | SYSTOLIC BLOOD PRESSURE: 126 MMHG | BODY MASS INDEX: 35.46 KG/M2 | RESPIRATION RATE: 18 BRPM | WEIGHT: 192.69 LBS | HEART RATE: 99 BPM

## 2021-05-05 DIAGNOSIS — E11.65 TYPE 2 DIABETES MELLITUS WITH HYPERGLYCEMIA, WITHOUT LONG-TERM CURRENT USE OF INSULIN: ICD-10-CM

## 2021-05-05 DIAGNOSIS — E78.5 HYPERLIPIDEMIA, UNSPECIFIED HYPERLIPIDEMIA TYPE: ICD-10-CM

## 2021-05-05 DIAGNOSIS — F41.9 ANXIETY: ICD-10-CM

## 2021-05-05 DIAGNOSIS — J30.2 SEASONAL ALLERGIES: ICD-10-CM

## 2021-05-05 DIAGNOSIS — I10 ESSENTIAL HYPERTENSION: ICD-10-CM

## 2021-05-05 DIAGNOSIS — K59.00 CONSTIPATION, UNSPECIFIED CONSTIPATION TYPE: ICD-10-CM

## 2021-05-05 DIAGNOSIS — E61.1 IRON DEFICIENCY: Primary | ICD-10-CM

## 2021-05-05 DIAGNOSIS — K21.9 GASTROESOPHAGEAL REFLUX DISEASE, UNSPECIFIED WHETHER ESOPHAGITIS PRESENT: ICD-10-CM

## 2021-05-05 PROCEDURE — 99999 PR PBB SHADOW E&M-EST. PATIENT-LVL III: ICD-10-PCS | Mod: PBBFAC,,, | Performed by: STUDENT IN AN ORGANIZED HEALTH CARE EDUCATION/TRAINING PROGRAM

## 2021-05-05 PROCEDURE — 99999 PR PBB SHADOW E&M-EST. PATIENT-LVL III: CPT | Mod: PBBFAC,,, | Performed by: STUDENT IN AN ORGANIZED HEALTH CARE EDUCATION/TRAINING PROGRAM

## 2021-05-05 PROCEDURE — 99204 PR OFFICE/OUTPT VISIT, NEW, LEVL IV, 45-59 MIN: ICD-10-PCS | Mod: S$PBB,,, | Performed by: STUDENT IN AN ORGANIZED HEALTH CARE EDUCATION/TRAINING PROGRAM

## 2021-05-05 PROCEDURE — 99204 OFFICE O/P NEW MOD 45 MIN: CPT | Mod: S$PBB,,, | Performed by: STUDENT IN AN ORGANIZED HEALTH CARE EDUCATION/TRAINING PROGRAM

## 2021-05-05 PROCEDURE — 99213 OFFICE O/P EST LOW 20 MIN: CPT | Mod: PBBFAC | Performed by: STUDENT IN AN ORGANIZED HEALTH CARE EDUCATION/TRAINING PROGRAM

## 2021-05-05 RX ORDER — PANTOPRAZOLE SODIUM 40 MG/1
40 TABLET, DELAYED RELEASE ORAL DAILY
Qty: 30 TABLET | Refills: 5 | Status: SHIPPED | OUTPATIENT
Start: 2021-05-05 | End: 2021-12-07

## 2021-05-05 RX ORDER — BLOOD SUGAR DIAGNOSTIC
STRIP MISCELLANEOUS
Qty: 100 STRIP | Refills: 11 | Status: SHIPPED | OUTPATIENT
Start: 2021-05-05 | End: 2022-05-19 | Stop reason: SDUPTHER

## 2021-05-05 RX ORDER — EMPAGLIFLOZIN AND LINAGLIPTIN 25; 5 MG/1; MG/1
TABLET, FILM COATED ORAL
Qty: 30 TABLET | Refills: 5 | Status: SHIPPED | OUTPATIENT
Start: 2021-05-05 | End: 2021-12-12

## 2021-05-05 RX ORDER — BUSPIRONE HYDROCHLORIDE 15 MG/1
15 TABLET ORAL 3 TIMES DAILY
Qty: 90 TABLET | Refills: 5 | Status: SHIPPED | OUTPATIENT
Start: 2021-05-05 | End: 2021-12-28 | Stop reason: SDUPTHER

## 2021-05-05 RX ORDER — ATORVASTATIN CALCIUM 20 MG/1
20 TABLET, FILM COATED ORAL NIGHTLY
Qty: 30 TABLET | Refills: 5 | Status: SHIPPED | OUTPATIENT
Start: 2021-05-05 | End: 2021-06-16

## 2021-05-05 RX ORDER — SPIRONOLACTONE 100 MG/1
100 TABLET, FILM COATED ORAL DAILY
Qty: 30 TABLET | Refills: 5 | Status: SHIPPED | OUTPATIENT
Start: 2021-05-05 | End: 2021-12-12

## 2021-05-05 RX ORDER — INSULIN PUMP SYRINGE, 3 ML
EACH MISCELLANEOUS
Qty: 1 EACH | Refills: 0 | Status: SHIPPED | OUTPATIENT
Start: 2021-05-05 | End: 2023-09-05 | Stop reason: SDUPTHER

## 2021-05-05 RX ORDER — CETIRIZINE HYDROCHLORIDE 10 MG/1
TABLET ORAL
Qty: 30 TABLET | Refills: 5 | Status: SHIPPED | OUTPATIENT
Start: 2021-05-05 | End: 2021-12-12

## 2021-05-05 RX ORDER — BUSPIRONE HYDROCHLORIDE 15 MG/1
15 TABLET ORAL
COMMUNITY
End: 2021-05-05 | Stop reason: SDUPTHER

## 2021-05-05 RX ORDER — PROMETHAZINE HYDROCHLORIDE AND DEXTROMETHORPHAN HYDROBROMIDE 6.25; 15 MG/5ML; MG/5ML
5 SYRUP ORAL EVERY 8 HOURS PRN
Qty: 180 ML | Refills: 0 | Status: SHIPPED | OUTPATIENT
Start: 2021-05-05 | End: 2021-07-23 | Stop reason: SDUPTHER

## 2021-05-05 RX ORDER — METFORMIN HYDROCHLORIDE 500 MG/1
500 TABLET ORAL 2 TIMES DAILY WITH MEALS
Qty: 60 TABLET | Refills: 0 | Status: SHIPPED | OUTPATIENT
Start: 2021-05-05 | End: 2021-06-01

## 2021-06-09 ENCOUNTER — TELEPHONE (OUTPATIENT)
Dept: FAMILY MEDICINE | Facility: CLINIC | Age: 36
End: 2021-06-09

## 2021-06-11 ENCOUNTER — LAB VISIT (OUTPATIENT)
Dept: LAB | Facility: HOSPITAL | Age: 36
End: 2021-06-11
Attending: STUDENT IN AN ORGANIZED HEALTH CARE EDUCATION/TRAINING PROGRAM
Payer: MEDICAID

## 2021-06-11 DIAGNOSIS — E61.1 IRON DEFICIENCY: ICD-10-CM

## 2021-06-11 DIAGNOSIS — E11.65 TYPE 2 DIABETES MELLITUS WITH HYPERGLYCEMIA, WITHOUT LONG-TERM CURRENT USE OF INSULIN: ICD-10-CM

## 2021-06-11 DIAGNOSIS — E78.5 HYPERLIPIDEMIA, UNSPECIFIED HYPERLIPIDEMIA TYPE: ICD-10-CM

## 2021-06-11 LAB
ALBUMIN SERPL BCP-MCNC: 4.1 G/DL (ref 3.5–5.2)
ALP SERPL-CCNC: 117 U/L (ref 55–135)
ALT SERPL W/O P-5'-P-CCNC: 37 U/L (ref 10–44)
ANION GAP SERPL CALC-SCNC: 10 MMOL/L (ref 8–16)
AST SERPL-CCNC: 16 U/L (ref 10–40)
BASOPHILS # BLD AUTO: 0.06 K/UL (ref 0–0.2)
BASOPHILS NFR BLD: 0.7 % (ref 0–1.9)
BILIRUB SERPL-MCNC: 0.5 MG/DL (ref 0.1–1)
BUN SERPL-MCNC: 10 MG/DL (ref 6–20)
CALCIUM SERPL-MCNC: 10.1 MG/DL (ref 8.7–10.5)
CHLORIDE SERPL-SCNC: 101 MMOL/L (ref 95–110)
CHOLEST SERPL-MCNC: 235 MG/DL (ref 120–199)
CHOLEST/HDLC SERPL: 5.3 {RATIO} (ref 2–5)
CO2 SERPL-SCNC: 25 MMOL/L (ref 23–29)
CREAT SERPL-MCNC: 0.8 MG/DL (ref 0.5–1.4)
DIFFERENTIAL METHOD: ABNORMAL
EOSINOPHIL # BLD AUTO: 0.1 K/UL (ref 0–0.5)
EOSINOPHIL NFR BLD: 0.6 % (ref 0–8)
ERYTHROCYTE [DISTWIDTH] IN BLOOD BY AUTOMATED COUNT: 16.1 % (ref 11.5–14.5)
EST. GFR  (AFRICAN AMERICAN): >60 ML/MIN/1.73 M^2
EST. GFR  (NON AFRICAN AMERICAN): >60 ML/MIN/1.73 M^2
GLUCOSE SERPL-MCNC: 107 MG/DL (ref 70–110)
HCT VFR BLD AUTO: 44.6 % (ref 37–48.5)
HDLC SERPL-MCNC: 44 MG/DL (ref 40–75)
HDLC SERPL: 18.7 % (ref 20–50)
HGB BLD-MCNC: 13.5 G/DL (ref 12–16)
IMM GRANULOCYTES # BLD AUTO: 0.05 K/UL (ref 0–0.04)
IMM GRANULOCYTES NFR BLD AUTO: 0.6 % (ref 0–0.5)
LDLC SERPL CALC-MCNC: 158.2 MG/DL (ref 63–159)
LYMPHOCYTES # BLD AUTO: 2.7 K/UL (ref 1–4.8)
LYMPHOCYTES NFR BLD: 31.9 % (ref 18–48)
MCH RBC QN AUTO: 23.8 PG (ref 27–31)
MCHC RBC AUTO-ENTMCNC: 30.3 G/DL (ref 32–36)
MCV RBC AUTO: 79 FL (ref 82–98)
MONOCYTES # BLD AUTO: 0.9 K/UL (ref 0.3–1)
MONOCYTES NFR BLD: 10.9 % (ref 4–15)
NEUTROPHILS # BLD AUTO: 4.6 K/UL (ref 1.8–7.7)
NEUTROPHILS NFR BLD: 55.3 % (ref 38–73)
NONHDLC SERPL-MCNC: 191 MG/DL
NRBC BLD-RTO: 0 /100 WBC
PLATELET # BLD AUTO: 584 K/UL (ref 150–450)
PMV BLD AUTO: 8.9 FL (ref 9.2–12.9)
POTASSIUM SERPL-SCNC: 3.3 MMOL/L (ref 3.5–5.1)
PROT SERPL-MCNC: 9 G/DL (ref 6–8.4)
RBC # BLD AUTO: 5.68 M/UL (ref 4–5.4)
SODIUM SERPL-SCNC: 136 MMOL/L (ref 136–145)
TRIGL SERPL-MCNC: 164 MG/DL (ref 30–150)
WBC # BLD AUTO: 8.35 K/UL (ref 3.9–12.7)

## 2021-06-11 PROCEDURE — 83540 ASSAY OF IRON: CPT | Performed by: STUDENT IN AN ORGANIZED HEALTH CARE EDUCATION/TRAINING PROGRAM

## 2021-06-11 PROCEDURE — 83036 HEMOGLOBIN GLYCOSYLATED A1C: CPT | Performed by: STUDENT IN AN ORGANIZED HEALTH CARE EDUCATION/TRAINING PROGRAM

## 2021-06-11 PROCEDURE — 85025 COMPLETE CBC W/AUTO DIFF WBC: CPT | Performed by: STUDENT IN AN ORGANIZED HEALTH CARE EDUCATION/TRAINING PROGRAM

## 2021-06-11 PROCEDURE — 36415 COLL VENOUS BLD VENIPUNCTURE: CPT | Performed by: STUDENT IN AN ORGANIZED HEALTH CARE EDUCATION/TRAINING PROGRAM

## 2021-06-11 PROCEDURE — 80061 LIPID PANEL: CPT | Performed by: STUDENT IN AN ORGANIZED HEALTH CARE EDUCATION/TRAINING PROGRAM

## 2021-06-11 PROCEDURE — 80053 COMPREHEN METABOLIC PANEL: CPT | Performed by: STUDENT IN AN ORGANIZED HEALTH CARE EDUCATION/TRAINING PROGRAM

## 2021-06-12 LAB
ESTIMATED AVG GLUCOSE: 126 MG/DL (ref 68–131)
HBA1C MFR BLD: 6 % (ref 4–5.6)
IRON SERPL-MCNC: 33 UG/DL (ref 30–160)
SATURATED IRON: 7 % (ref 20–50)
TOTAL IRON BINDING CAPACITY: 505 UG/DL (ref 250–450)
TRANSFERRIN SERPL-MCNC: 341 MG/DL (ref 200–375)

## 2021-06-16 ENCOUNTER — PATIENT MESSAGE (OUTPATIENT)
Dept: FAMILY MEDICINE | Facility: CLINIC | Age: 36
End: 2021-06-16

## 2021-06-16 DIAGNOSIS — D50.9 IRON DEFICIENCY ANEMIA, UNSPECIFIED IRON DEFICIENCY ANEMIA TYPE: Primary | ICD-10-CM

## 2021-06-16 PROBLEM — E78.2 MIXED HYPERLIPIDEMIA: Status: ACTIVE | Noted: 2021-06-16

## 2021-06-16 PROBLEM — K21.9 GERD (GASTROESOPHAGEAL REFLUX DISEASE): Status: ACTIVE | Noted: 2021-06-16

## 2021-06-16 PROBLEM — E11.9 TYPE 2 DIABETES MELLITUS WITHOUT COMPLICATION, WITHOUT LONG-TERM CURRENT USE OF INSULIN: Status: ACTIVE | Noted: 2021-06-16

## 2021-06-16 PROBLEM — F41.9 ANXIETY: Status: ACTIVE | Noted: 2021-06-16

## 2021-06-16 PROBLEM — K59.09 OTHER CONSTIPATION: Status: ACTIVE | Noted: 2021-06-16

## 2021-06-16 RX ORDER — FERROUS SULFATE 325(65) MG
325 TABLET, DELAYED RELEASE (ENTERIC COATED) ORAL
Qty: 90 TABLET | Refills: 1 | Status: SHIPPED | OUTPATIENT
Start: 2021-06-16 | End: 2021-12-28 | Stop reason: SDUPTHER

## 2021-06-16 RX ORDER — CYCLOBENZAPRINE HCL 10 MG
10 TABLET ORAL 3 TIMES DAILY PRN
Qty: 15 TABLET | Refills: 0 | Status: SHIPPED | OUTPATIENT
Start: 2021-06-16 | End: 2021-07-23 | Stop reason: SDUPTHER

## 2021-06-16 RX ORDER — ATORVASTATIN CALCIUM 40 MG/1
40 TABLET, FILM COATED ORAL NIGHTLY
Qty: 90 TABLET | Refills: 3 | Status: SHIPPED | OUTPATIENT
Start: 2021-06-16 | End: 2021-12-28 | Stop reason: SDUPTHER

## 2021-06-16 RX ORDER — CYCLOBENZAPRINE HCL 10 MG
10 TABLET ORAL 3 TIMES DAILY PRN
Qty: 15 TABLET | Refills: 0 | Status: SHIPPED | OUTPATIENT
Start: 2021-06-16 | End: 2021-06-16 | Stop reason: SDUPTHER

## 2021-06-17 ENCOUNTER — TELEPHONE (OUTPATIENT)
Dept: FAMILY MEDICINE | Facility: CLINIC | Age: 36
End: 2021-06-17

## 2021-06-18 DIAGNOSIS — E11.9 TYPE 2 DIABETES MELLITUS WITHOUT COMPLICATION, WITHOUT LONG-TERM CURRENT USE OF INSULIN: Primary | ICD-10-CM

## 2021-06-18 RX ORDER — FLASH GLUCOSE SENSOR
1 KIT MISCELLANEOUS
Qty: 1 KIT | Refills: 11 | Status: SHIPPED | OUTPATIENT
Start: 2021-06-18 | End: 2022-05-19 | Stop reason: SDUPTHER

## 2021-06-22 ENCOUNTER — PATIENT OUTREACH (OUTPATIENT)
Dept: ADMINISTRATIVE | Facility: HOSPITAL | Age: 36
End: 2021-06-22

## 2021-07-23 ENCOUNTER — TELEPHONE (OUTPATIENT)
Dept: FAMILY MEDICINE | Facility: CLINIC | Age: 36
End: 2021-07-23

## 2021-07-23 DIAGNOSIS — M62.838 MUSCLE SPASM: ICD-10-CM

## 2021-07-23 DIAGNOSIS — R05.9 COUGH: Primary | ICD-10-CM

## 2021-07-23 RX ORDER — CYCLOBENZAPRINE HCL 10 MG
10 TABLET ORAL 3 TIMES DAILY PRN
Qty: 15 TABLET | Refills: 0 | Status: SHIPPED | OUTPATIENT
Start: 2021-07-23 | End: 2021-07-28

## 2021-07-23 RX ORDER — PROMETHAZINE HYDROCHLORIDE AND DEXTROMETHORPHAN HYDROBROMIDE 6.25; 15 MG/5ML; MG/5ML
5 SYRUP ORAL EVERY 8 HOURS PRN
Qty: 180 ML | Refills: 0 | Status: SHIPPED | OUTPATIENT
Start: 2021-07-23 | End: 2021-08-02

## 2021-12-13 DIAGNOSIS — E11.65 TYPE 2 DIABETES MELLITUS WITH HYPERGLYCEMIA, WITHOUT LONG-TERM CURRENT USE OF INSULIN: ICD-10-CM

## 2021-12-14 RX ORDER — METFORMIN HYDROCHLORIDE 500 MG/1
TABLET ORAL
Qty: 180 TABLET | Refills: 1 | Status: SHIPPED | OUTPATIENT
Start: 2021-12-14 | End: 2021-12-28 | Stop reason: SDUPTHER

## 2021-12-28 ENCOUNTER — OFFICE VISIT (OUTPATIENT)
Dept: FAMILY MEDICINE | Facility: CLINIC | Age: 36
End: 2021-12-28
Payer: MEDICAID

## 2021-12-28 VITALS
WEIGHT: 183.63 LBS | RESPIRATION RATE: 20 BRPM | SYSTOLIC BLOOD PRESSURE: 116 MMHG | HEART RATE: 96 BPM | HEIGHT: 62 IN | DIASTOLIC BLOOD PRESSURE: 82 MMHG | BODY MASS INDEX: 33.79 KG/M2

## 2021-12-28 DIAGNOSIS — E11.65 TYPE 2 DIABETES MELLITUS WITH HYPERGLYCEMIA, WITHOUT LONG-TERM CURRENT USE OF INSULIN: ICD-10-CM

## 2021-12-28 DIAGNOSIS — K59.00 CONSTIPATION, UNSPECIFIED CONSTIPATION TYPE: ICD-10-CM

## 2021-12-28 DIAGNOSIS — I10 ESSENTIAL HYPERTENSION: ICD-10-CM

## 2021-12-28 DIAGNOSIS — D50.9 IRON DEFICIENCY ANEMIA, UNSPECIFIED IRON DEFICIENCY ANEMIA TYPE: ICD-10-CM

## 2021-12-28 DIAGNOSIS — K21.9 GASTROESOPHAGEAL REFLUX DISEASE, UNSPECIFIED WHETHER ESOPHAGITIS PRESENT: ICD-10-CM

## 2021-12-28 DIAGNOSIS — M54.50 CHRONIC MIDLINE LOW BACK PAIN, UNSPECIFIED WHETHER SCIATICA PRESENT: Primary | ICD-10-CM

## 2021-12-28 DIAGNOSIS — J30.2 SEASONAL ALLERGIES: ICD-10-CM

## 2021-12-28 DIAGNOSIS — F41.9 ANXIETY: ICD-10-CM

## 2021-12-28 DIAGNOSIS — G89.29 CHRONIC MIDLINE LOW BACK PAIN, UNSPECIFIED WHETHER SCIATICA PRESENT: Primary | ICD-10-CM

## 2021-12-28 PROCEDURE — 1159F PR MEDICATION LIST DOCUMENTED IN MEDICAL RECORD: ICD-10-PCS | Mod: CPTII,,, | Performed by: STUDENT IN AN ORGANIZED HEALTH CARE EDUCATION/TRAINING PROGRAM

## 2021-12-28 PROCEDURE — 3008F BODY MASS INDEX DOCD: CPT | Mod: CPTII,,, | Performed by: STUDENT IN AN ORGANIZED HEALTH CARE EDUCATION/TRAINING PROGRAM

## 2021-12-28 PROCEDURE — 99213 OFFICE O/P EST LOW 20 MIN: CPT | Mod: PBBFAC | Performed by: STUDENT IN AN ORGANIZED HEALTH CARE EDUCATION/TRAINING PROGRAM

## 2021-12-28 PROCEDURE — 3044F HG A1C LEVEL LT 7.0%: CPT | Mod: CPTII,,, | Performed by: STUDENT IN AN ORGANIZED HEALTH CARE EDUCATION/TRAINING PROGRAM

## 2021-12-28 PROCEDURE — 3079F PR MOST RECENT DIASTOLIC BLOOD PRESSURE 80-89 MM HG: ICD-10-PCS | Mod: CPTII,,, | Performed by: STUDENT IN AN ORGANIZED HEALTH CARE EDUCATION/TRAINING PROGRAM

## 2021-12-28 PROCEDURE — 1159F MED LIST DOCD IN RCRD: CPT | Mod: CPTII,,, | Performed by: STUDENT IN AN ORGANIZED HEALTH CARE EDUCATION/TRAINING PROGRAM

## 2021-12-28 PROCEDURE — 99999 PR PBB SHADOW E&M-EST. PATIENT-LVL III: CPT | Mod: PBBFAC,,, | Performed by: STUDENT IN AN ORGANIZED HEALTH CARE EDUCATION/TRAINING PROGRAM

## 2021-12-28 PROCEDURE — 1160F RVW MEDS BY RX/DR IN RCRD: CPT | Mod: CPTII,,, | Performed by: STUDENT IN AN ORGANIZED HEALTH CARE EDUCATION/TRAINING PROGRAM

## 2021-12-28 PROCEDURE — 3074F PR MOST RECENT SYSTOLIC BLOOD PRESSURE < 130 MM HG: ICD-10-PCS | Mod: CPTII,,, | Performed by: STUDENT IN AN ORGANIZED HEALTH CARE EDUCATION/TRAINING PROGRAM

## 2021-12-28 PROCEDURE — 3074F SYST BP LT 130 MM HG: CPT | Mod: CPTII,,, | Performed by: STUDENT IN AN ORGANIZED HEALTH CARE EDUCATION/TRAINING PROGRAM

## 2021-12-28 PROCEDURE — 3079F DIAST BP 80-89 MM HG: CPT | Mod: CPTII,,, | Performed by: STUDENT IN AN ORGANIZED HEALTH CARE EDUCATION/TRAINING PROGRAM

## 2021-12-28 PROCEDURE — 99214 OFFICE O/P EST MOD 30 MIN: CPT | Mod: S$PBB,,, | Performed by: STUDENT IN AN ORGANIZED HEALTH CARE EDUCATION/TRAINING PROGRAM

## 2021-12-28 PROCEDURE — 99214 PR OFFICE/OUTPT VISIT, EST, LEVL IV, 30-39 MIN: ICD-10-PCS | Mod: S$PBB,,, | Performed by: STUDENT IN AN ORGANIZED HEALTH CARE EDUCATION/TRAINING PROGRAM

## 2021-12-28 PROCEDURE — 99999 PR PBB SHADOW E&M-EST. PATIENT-LVL III: ICD-10-PCS | Mod: PBBFAC,,, | Performed by: STUDENT IN AN ORGANIZED HEALTH CARE EDUCATION/TRAINING PROGRAM

## 2021-12-28 PROCEDURE — 3044F PR MOST RECENT HEMOGLOBIN A1C LEVEL <7.0%: ICD-10-PCS | Mod: CPTII,,, | Performed by: STUDENT IN AN ORGANIZED HEALTH CARE EDUCATION/TRAINING PROGRAM

## 2021-12-28 PROCEDURE — 1160F PR REVIEW ALL MEDS BY PRESCRIBER/CLIN PHARMACIST DOCUMENTED: ICD-10-PCS | Mod: CPTII,,, | Performed by: STUDENT IN AN ORGANIZED HEALTH CARE EDUCATION/TRAINING PROGRAM

## 2021-12-28 PROCEDURE — 3008F PR BODY MASS INDEX (BMI) DOCUMENTED: ICD-10-PCS | Mod: CPTII,,, | Performed by: STUDENT IN AN ORGANIZED HEALTH CARE EDUCATION/TRAINING PROGRAM

## 2021-12-28 RX ORDER — ALBUTEROL SULFATE 5 MG/ML
2.5 SOLUTION RESPIRATORY (INHALATION) EVERY 6 HOURS PRN
Qty: 20 ML | Refills: 0 | Status: SHIPPED | OUTPATIENT
Start: 2021-12-28 | End: 2022-02-14 | Stop reason: SDUPTHER

## 2021-12-28 RX ORDER — ATORVASTATIN CALCIUM 40 MG/1
40 TABLET, FILM COATED ORAL NIGHTLY
Qty: 90 TABLET | Refills: 3 | Status: SHIPPED | OUTPATIENT
Start: 2021-12-28 | End: 2022-02-14 | Stop reason: SDUPTHER

## 2021-12-28 RX ORDER — QUETIAPINE FUMARATE 300 MG/1
600 TABLET, FILM COATED ORAL DAILY
Status: CANCELLED | OUTPATIENT
Start: 2021-12-28

## 2021-12-28 RX ORDER — PANTOPRAZOLE SODIUM 40 MG/1
40 TABLET, DELAYED RELEASE ORAL DAILY
Qty: 30 TABLET | Refills: 5 | Status: SHIPPED | OUTPATIENT
Start: 2021-12-28 | End: 2022-02-15

## 2021-12-28 RX ORDER — SPIRONOLACTONE 100 MG/1
100 TABLET, FILM COATED ORAL DAILY
Qty: 90 TABLET | Refills: 1 | Status: SHIPPED | OUTPATIENT
Start: 2021-12-28 | End: 2022-04-18 | Stop reason: SDUPTHER

## 2021-12-28 RX ORDER — TRAMADOL HYDROCHLORIDE 50 MG/1
50 TABLET ORAL EVERY 6 HOURS PRN
Qty: 12 TABLET | Refills: 0 | Status: CANCELLED | OUTPATIENT
Start: 2021-12-28

## 2021-12-28 RX ORDER — FERROUS SULFATE 325(65) MG
325 TABLET, DELAYED RELEASE (ENTERIC COATED) ORAL
Qty: 90 TABLET | Refills: 1 | Status: SHIPPED | OUTPATIENT
Start: 2021-12-28 | End: 2022-11-03

## 2021-12-28 RX ORDER — EMPAGLIFLOZIN AND LINAGLIPTIN 25; 5 MG/1; MG/1
TABLET, FILM COATED ORAL
Qty: 90 TABLET | Refills: 0 | Status: SHIPPED | OUTPATIENT
Start: 2021-12-28 | End: 2022-04-18 | Stop reason: SDUPTHER

## 2021-12-28 RX ORDER — DEXTROAMPHETAMINE SACCHARATE, AMPHETAMINE ASPARTATE, DEXTROAMPHETAMINE SULFATE AND AMPHETAMINE SULFATE 7.5; 7.5; 7.5; 7.5 MG/1; MG/1; MG/1; MG/1
TABLET ORAL
Refills: 0 | Status: CANCELLED | OUTPATIENT
Start: 2021-12-28

## 2021-12-28 RX ORDER — BUSPIRONE HYDROCHLORIDE 15 MG/1
15 TABLET ORAL 3 TIMES DAILY
Qty: 90 TABLET | Refills: 5 | Status: SHIPPED | OUTPATIENT
Start: 2021-12-28 | End: 2022-02-14 | Stop reason: SDUPTHER

## 2021-12-28 RX ORDER — METFORMIN HYDROCHLORIDE 500 MG/1
500 TABLET ORAL 2 TIMES DAILY WITH MEALS
Qty: 180 TABLET | Refills: 1 | Status: SHIPPED | OUTPATIENT
Start: 2021-12-28 | End: 2022-02-14 | Stop reason: SDUPTHER

## 2021-12-28 RX ORDER — CETIRIZINE HYDROCHLORIDE 10 MG/1
TABLET ORAL
Qty: 90 TABLET | Refills: 1 | Status: SHIPPED | OUTPATIENT
Start: 2021-12-28 | End: 2022-02-14 | Stop reason: SDUPTHER

## 2021-12-28 RX ORDER — CYCLOBENZAPRINE HCL 10 MG
10 TABLET ORAL NIGHTLY PRN
Qty: 15 TABLET | Refills: 2 | Status: SHIPPED | OUTPATIENT
Start: 2021-12-28 | End: 2022-04-19

## 2021-12-29 ENCOUNTER — LAB VISIT (OUTPATIENT)
Dept: LAB | Facility: HOSPITAL | Age: 36
End: 2021-12-29
Attending: STUDENT IN AN ORGANIZED HEALTH CARE EDUCATION/TRAINING PROGRAM
Payer: MEDICAID

## 2021-12-29 DIAGNOSIS — E61.1 IRON DEFICIENCY: ICD-10-CM

## 2021-12-29 DIAGNOSIS — E11.65 TYPE 2 DIABETES MELLITUS WITH HYPERGLYCEMIA, WITHOUT LONG-TERM CURRENT USE OF INSULIN: ICD-10-CM

## 2021-12-29 DIAGNOSIS — D50.9 IRON DEFICIENCY ANEMIA, UNSPECIFIED IRON DEFICIENCY ANEMIA TYPE: Primary | ICD-10-CM

## 2021-12-29 LAB
ALBUMIN SERPL BCP-MCNC: 3.4 G/DL (ref 3.5–5.2)
ALBUMIN/CREAT UR: 13.4 UG/MG (ref 0–30)
ALP SERPL-CCNC: 103 U/L (ref 55–135)
ALT SERPL W/O P-5'-P-CCNC: 26 U/L (ref 10–44)
ANION GAP SERPL CALC-SCNC: 10 MMOL/L (ref 8–16)
AST SERPL-CCNC: 15 U/L (ref 10–40)
BASOPHILS # BLD AUTO: ABNORMAL K/UL (ref 0–0.2)
BASOPHILS NFR BLD: 0 % (ref 0–1.9)
BILIRUB SERPL-MCNC: 0.2 MG/DL (ref 0.1–1)
BUN SERPL-MCNC: 8 MG/DL (ref 6–20)
CALCIUM SERPL-MCNC: 8.7 MG/DL (ref 8.7–10.5)
CHLORIDE SERPL-SCNC: 107 MMOL/L (ref 95–110)
CO2 SERPL-SCNC: 23 MMOL/L (ref 23–29)
CREAT SERPL-MCNC: 0.8 MG/DL (ref 0.5–1.4)
CREAT UR-MCNC: 67.2 MG/DL (ref 15–325)
DIFFERENTIAL METHOD: ABNORMAL
EOSINOPHIL # BLD AUTO: ABNORMAL K/UL (ref 0–0.5)
EOSINOPHIL NFR BLD: 5 % (ref 0–8)
ERYTHROCYTE [DISTWIDTH] IN BLOOD BY AUTOMATED COUNT: 17.3 % (ref 11.5–14.5)
EST. GFR  (AFRICAN AMERICAN): >60 ML/MIN/1.73 M^2
EST. GFR  (NON AFRICAN AMERICAN): >60 ML/MIN/1.73 M^2
ESTIMATED AVG GLUCOSE: 126 MG/DL (ref 68–131)
GLUCOSE SERPL-MCNC: 135 MG/DL (ref 70–110)
HBA1C MFR BLD: 6 % (ref 4–5.6)
HCT VFR BLD AUTO: 36.2 % (ref 37–48.5)
HGB BLD-MCNC: 11.3 G/DL (ref 12–16)
IMM GRANULOCYTES # BLD AUTO: ABNORMAL K/UL (ref 0–0.04)
IMM GRANULOCYTES NFR BLD AUTO: ABNORMAL % (ref 0–0.5)
LYMPHOCYTES # BLD AUTO: ABNORMAL K/UL (ref 1–4.8)
LYMPHOCYTES NFR BLD: 38 % (ref 18–48)
MCH RBC QN AUTO: 24.4 PG (ref 27–31)
MCHC RBC AUTO-ENTMCNC: 31.2 G/DL (ref 32–36)
MCV RBC AUTO: 78 FL (ref 82–98)
MICROALBUMIN UR DL<=1MG/L-MCNC: 9 UG/ML
MONOCYTES # BLD AUTO: ABNORMAL K/UL (ref 0.3–1)
MONOCYTES NFR BLD: 8 % (ref 4–15)
NEUTROPHILS NFR BLD: 49 % (ref 38–73)
NRBC BLD-RTO: 0 /100 WBC
PLATELET # BLD AUTO: 480 K/UL (ref 150–450)
PLATELET BLD QL SMEAR: ABNORMAL
PMV BLD AUTO: 8.7 FL (ref 9.2–12.9)
POTASSIUM SERPL-SCNC: 3.4 MMOL/L (ref 3.5–5.1)
PROT SERPL-MCNC: 7.1 G/DL (ref 6–8.4)
RBC # BLD AUTO: 4.63 M/UL (ref 4–5.4)
SODIUM SERPL-SCNC: 140 MMOL/L (ref 136–145)
WBC # BLD AUTO: 7.58 K/UL (ref 3.9–12.7)

## 2021-12-29 PROCEDURE — 85007 BL SMEAR W/DIFF WBC COUNT: CPT | Performed by: STUDENT IN AN ORGANIZED HEALTH CARE EDUCATION/TRAINING PROGRAM

## 2021-12-29 PROCEDURE — 36415 COLL VENOUS BLD VENIPUNCTURE: CPT | Performed by: STUDENT IN AN ORGANIZED HEALTH CARE EDUCATION/TRAINING PROGRAM

## 2021-12-29 PROCEDURE — 82043 UR ALBUMIN QUANTITATIVE: CPT | Performed by: STUDENT IN AN ORGANIZED HEALTH CARE EDUCATION/TRAINING PROGRAM

## 2021-12-29 PROCEDURE — 83036 HEMOGLOBIN GLYCOSYLATED A1C: CPT | Performed by: STUDENT IN AN ORGANIZED HEALTH CARE EDUCATION/TRAINING PROGRAM

## 2021-12-29 PROCEDURE — 80053 COMPREHEN METABOLIC PANEL: CPT | Performed by: STUDENT IN AN ORGANIZED HEALTH CARE EDUCATION/TRAINING PROGRAM

## 2021-12-29 PROCEDURE — 85027 COMPLETE CBC AUTOMATED: CPT | Performed by: STUDENT IN AN ORGANIZED HEALTH CARE EDUCATION/TRAINING PROGRAM

## 2021-12-30 ENCOUNTER — TELEPHONE (OUTPATIENT)
Dept: FAMILY MEDICINE | Facility: CLINIC | Age: 36
End: 2021-12-30

## 2021-12-30 ENCOUNTER — IMMUNIZATION (OUTPATIENT)
Dept: FAMILY MEDICINE | Facility: CLINIC | Age: 36
End: 2021-12-30
Payer: MEDICAID

## 2021-12-30 DIAGNOSIS — Z23 NEED FOR VACCINATION: Primary | ICD-10-CM

## 2021-12-30 PROCEDURE — 0004A COVID-19, MRNA, LNP-S, PF, 30 MCG/0.3 ML DOSE VACCINE: CPT | Mod: PBBFAC

## 2021-12-30 NOTE — TELEPHONE ENCOUNTER
----- Message from Guillermo Duke sent at 2021  2:32 PM CST -----  Contact: franca/pharmacy  Ethel Glass  MRN: [id number]  : 1985  PCP: Raad Orta  Home Phone      [phone]  Work Phone      Not on file.  Mobile          [phone]  Home Phone      [phone]  Mobile          [phone]      MESSAGE:   Pharmacy is calling to clarify an RX.  RX name:   albuterol (PROVENTIL) 5 mg/mL nebulizer solution  What do they need to clarify:  want to know if they can change it to the already dilated solution  [facility]  Comments:       Phone:  [phone]

## 2022-01-04 DIAGNOSIS — D50.9 IRON DEFICIENCY ANEMIA, UNSPECIFIED IRON DEFICIENCY ANEMIA TYPE: Primary | ICD-10-CM

## 2022-01-05 ENCOUNTER — TELEPHONE (OUTPATIENT)
Dept: FAMILY MEDICINE | Facility: CLINIC | Age: 37
End: 2022-01-05
Payer: MEDICAID

## 2022-01-05 NOTE — TELEPHONE ENCOUNTER
Pt is requesting to speak to you regarding bloodwork. Pt has specific questions regarding all of the high and low numbers.

## 2022-01-05 NOTE — PROGRESS NOTES
Please inform the pt A1c is 6 which is at goal. She again has anemia. I would like to check her iron again, order placed. Also her potassium was slightly low. Have her increase potassium rich foods like bananas, sweet potatoes, spinach, tomatoes, beans, etc.   Thanks,  Dr. Orta

## 2022-01-05 NOTE — TELEPHONE ENCOUNTER
----- Message from Raad Orta MD sent at 1/4/2022  6:26 PM CST -----  Please inform the pt A1c is 6 which is at goal. She again has anemia. I would like to check her iron again, order placed. Also her potassium was slightly low. Have her increase potassium rich foods like bananas, sweet potatoes, spinach, tomatoes, elmo  ns, etc.   Thanks,  Dr. Orta

## 2022-01-05 NOTE — TELEPHONE ENCOUNTER
Spoke to patient and gave her lab results.  She states she would like to speak to nurse.  She has questions about her labs.

## 2022-01-07 ENCOUNTER — CLINICAL SUPPORT (OUTPATIENT)
Dept: FAMILY MEDICINE | Facility: CLINIC | Age: 37
End: 2022-01-07
Payer: MEDICAID

## 2022-01-07 DIAGNOSIS — D50.9 IRON DEFICIENCY ANEMIA, UNSPECIFIED IRON DEFICIENCY ANEMIA TYPE: ICD-10-CM

## 2022-01-07 PROCEDURE — 36415 COLL VENOUS BLD VENIPUNCTURE: CPT | Mod: PBBFAC

## 2022-01-07 PROCEDURE — 84466 ASSAY OF TRANSFERRIN: CPT | Performed by: STUDENT IN AN ORGANIZED HEALTH CARE EDUCATION/TRAINING PROGRAM

## 2022-01-08 LAB
IRON SERPL-MCNC: 24 UG/DL (ref 30–160)
SATURATED IRON: 5 % (ref 20–50)
TOTAL IRON BINDING CAPACITY: 444 UG/DL (ref 250–450)
TRANSFERRIN SERPL-MCNC: 300 MG/DL (ref 200–375)

## 2022-01-12 ENCOUNTER — TELEPHONE (OUTPATIENT)
Dept: INFUSION THERAPY | Facility: HOSPITAL | Age: 37
End: 2022-01-12
Payer: MEDICAID

## 2022-01-12 RX ORDER — HEPARIN 100 UNIT/ML
500 SYRINGE INTRAVENOUS
Status: CANCELLED | OUTPATIENT
Start: 2022-01-19

## 2022-01-12 RX ORDER — SODIUM CHLORIDE 0.9 % (FLUSH) 0.9 %
10 SYRINGE (ML) INJECTION
Status: CANCELLED | OUTPATIENT
Start: 2022-01-19

## 2022-01-31 ENCOUNTER — TELEPHONE (OUTPATIENT)
Dept: FAMILY MEDICINE | Facility: CLINIC | Age: 37
End: 2022-01-31
Payer: MEDICAID

## 2022-01-31 NOTE — TELEPHONE ENCOUNTER
----- Message from Nancy Ramos sent at 1/31/2022  2:20 PM CST -----  Contact: self  Pt states she recently tested positive for Covid (1/28) at Urgent Care. UC advised pt to follow up with PCP and ask about the infusion. Pt states she has diabetes and she is scheduled for an iron transfusion which she gets once a year.       Phone:  [phone]

## 2022-02-01 NOTE — TELEPHONE ENCOUNTER
Is she having significant symptoms? Her risk score is too low for infusion, but she may qualify for the new oral pill if it does not interact with her current meds. She needs to postpone the iron infusion until she is better.   Dr. Orta

## 2022-02-03 NOTE — TELEPHONE ENCOUNTER
Pt is weak, loss of appetite, loss of taste and smell. Denies congestion states that was all in the beginning did verbalize understanding to cancel iron infusion. Aware that we no longer are doing infusion for covid.

## 2022-02-14 ENCOUNTER — TELEPHONE (OUTPATIENT)
Dept: FAMILY MEDICINE | Facility: CLINIC | Age: 37
End: 2022-02-14
Payer: MEDICAID

## 2022-02-14 NOTE — TELEPHONE ENCOUNTER
----- Message from Guillermo Duke sent at 2022  1:38 PM CST -----  Contact: self  Ethel Glass  MRN: [id number]  : 1985  PCP: Raad Orta  Home Phone      [phone]  Work Phone      Not on file.  Mobile          [phone]  Home Phone      [phone]  Mobile          [phone]  Home Phone      [phone]      MESSAGE:     Pt states she was scheduled to do an iron infusion. States the day before she was supposed to go she tested positive for covid. The infusion center stated in order for her to get rescheduled that she has to have antibiotics.    Phone: [phone]

## 2022-02-16 ENCOUNTER — PATIENT MESSAGE (OUTPATIENT)
Dept: ADMINISTRATIVE | Facility: HOSPITAL | Age: 37
End: 2022-02-16
Payer: MEDICAID

## 2022-02-16 DIAGNOSIS — E11.9 TYPE 2 DIABETES MELLITUS WITHOUT COMPLICATION, UNSPECIFIED WHETHER LONG TERM INSULIN USE: ICD-10-CM

## 2022-02-16 NOTE — TELEPHONE ENCOUNTER
Ok, thanks. When she was first diagnosed I did calculate her risk score and it was too low for that, so we treated conservatively.   Dr. Orta

## 2022-04-04 ENCOUNTER — PATIENT MESSAGE (OUTPATIENT)
Dept: ADMINISTRATIVE | Facility: HOSPITAL | Age: 37
End: 2022-04-04
Payer: MEDICAID

## 2022-04-11 ENCOUNTER — HISTORICAL (OUTPATIENT)
Dept: ADMINISTRATIVE | Facility: HOSPITAL | Age: 37
End: 2022-04-11
Payer: MEDICAID

## 2022-04-13 ENCOUNTER — PATIENT OUTREACH (OUTPATIENT)
Dept: ADMINISTRATIVE | Facility: HOSPITAL | Age: 37
End: 2022-04-13
Payer: MEDICAID

## 2022-04-20 ENCOUNTER — TELEPHONE (OUTPATIENT)
Dept: FAMILY MEDICINE | Facility: CLINIC | Age: 37
End: 2022-04-20
Payer: MEDICAID

## 2022-04-20 NOTE — TELEPHONE ENCOUNTER
----- Message from Guillermo Duke sent at 2022  8:25 AM CDT -----  Contact: bhavesh lanier  Ethel Glass  MRN: [id number]  : 1985  PCP: Raad Orta  Home Phone      [phone]  Work Phone      Not on file.  Mobile          [phone]  Home Phone      [phone]  Mobile          [phone]  Home Phone      [phone]      MESSAGE:   Pharmacy is calling to clarify an RX.  RX name:   albuterol (PROVENTIL) 5 mg/mL nebulizer solution  What do they need to clarify:  do you want the concentrate or the already mixed solution?  Ph[facility] location[facility]  Comments:       Phone:  [phone]

## 2022-04-28 VITALS
OXYGEN SATURATION: 98 % | HEIGHT: 62 IN | DIASTOLIC BLOOD PRESSURE: 91 MMHG | SYSTOLIC BLOOD PRESSURE: 130 MMHG | BODY MASS INDEX: 31.85 KG/M2 | WEIGHT: 173.06 LBS

## 2022-05-11 ENCOUNTER — TELEPHONE (OUTPATIENT)
Dept: FAMILY MEDICINE | Facility: CLINIC | Age: 37
End: 2022-05-11
Payer: MEDICAID

## 2022-05-11 ENCOUNTER — INFUSION (OUTPATIENT)
Dept: INFUSION THERAPY | Facility: HOSPITAL | Age: 37
End: 2022-05-11
Attending: STUDENT IN AN ORGANIZED HEALTH CARE EDUCATION/TRAINING PROGRAM
Payer: MEDICAID

## 2022-05-11 VITALS — DIASTOLIC BLOOD PRESSURE: 76 MMHG | HEART RATE: 88 BPM | SYSTOLIC BLOOD PRESSURE: 107 MMHG | RESPIRATION RATE: 18 BRPM

## 2022-05-11 DIAGNOSIS — E11.65 TYPE 2 DIABETES MELLITUS WITH HYPERGLYCEMIA, WITHOUT LONG-TERM CURRENT USE OF INSULIN: ICD-10-CM

## 2022-05-11 DIAGNOSIS — E11.9 TYPE 2 DIABETES MELLITUS WITHOUT COMPLICATION, WITHOUT LONG-TERM CURRENT USE OF INSULIN: ICD-10-CM

## 2022-05-11 DIAGNOSIS — D50.9 IRON DEFICIENCY ANEMIA, UNSPECIFIED IRON DEFICIENCY ANEMIA TYPE: Primary | ICD-10-CM

## 2022-05-11 DIAGNOSIS — E11.9 TYPE 2 DIABETES MELLITUS WITHOUT COMPLICATION, WITHOUT LONG-TERM CURRENT USE OF INSULIN: Primary | ICD-10-CM

## 2022-05-11 DIAGNOSIS — E61.1 IRON DEFICIENCY: ICD-10-CM

## 2022-05-11 LAB
ALBUMIN/CREAT UR: 33 UG/MG (ref 0–30)
ANION GAP SERPL CALC-SCNC: 12 MMOL/L (ref 8–16)
BASOPHILS # BLD AUTO: 0.04 K/UL (ref 0–0.2)
BASOPHILS NFR BLD: 0.5 % (ref 0–1.9)
BUN SERPL-MCNC: 5 MG/DL (ref 6–20)
CALCIUM SERPL-MCNC: 9.1 MG/DL (ref 8.7–10.5)
CHLORIDE SERPL-SCNC: 104 MMOL/L (ref 95–110)
CO2 SERPL-SCNC: 23 MMOL/L (ref 23–29)
CREAT SERPL-MCNC: 0.6 MG/DL (ref 0.5–1.4)
CREAT UR-MCNC: 151.6 MG/DL (ref 15–325)
DIFFERENTIAL METHOD: ABNORMAL
EOSINOPHIL # BLD AUTO: 0.1 K/UL (ref 0–0.5)
EOSINOPHIL NFR BLD: 0.6 % (ref 0–8)
ERYTHROCYTE [DISTWIDTH] IN BLOOD BY AUTOMATED COUNT: 15.9 % (ref 11.5–14.5)
EST. GFR  (AFRICAN AMERICAN): >60 ML/MIN/1.73 M^2
EST. GFR  (NON AFRICAN AMERICAN): >60 ML/MIN/1.73 M^2
GLUCOSE SERPL-MCNC: 96 MG/DL (ref 70–110)
HCT VFR BLD AUTO: 36.8 % (ref 37–48.5)
HGB BLD-MCNC: 11.5 G/DL (ref 12–16)
IMM GRANULOCYTES # BLD AUTO: 0.03 K/UL (ref 0–0.04)
IMM GRANULOCYTES NFR BLD AUTO: 0.4 % (ref 0–0.5)
LYMPHOCYTES # BLD AUTO: 2.7 K/UL (ref 1–4.8)
LYMPHOCYTES NFR BLD: 33.3 % (ref 18–48)
MCH RBC QN AUTO: 23.9 PG (ref 27–31)
MCHC RBC AUTO-ENTMCNC: 31.3 G/DL (ref 32–36)
MCV RBC AUTO: 77 FL (ref 82–98)
MICROALBUMIN UR DL<=1MG/L-MCNC: 50 UG/ML
MONOCYTES # BLD AUTO: 0.9 K/UL (ref 0.3–1)
MONOCYTES NFR BLD: 10.6 % (ref 4–15)
NEUTROPHILS # BLD AUTO: 4.4 K/UL (ref 1.8–7.7)
NEUTROPHILS NFR BLD: 54.6 % (ref 38–73)
NRBC BLD-RTO: 0 /100 WBC
PLATELET # BLD AUTO: 510 K/UL (ref 150–450)
PMV BLD AUTO: 9 FL (ref 9.2–12.9)
POTASSIUM SERPL-SCNC: 3.3 MMOL/L (ref 3.5–5.1)
RBC # BLD AUTO: 4.81 M/UL (ref 4–5.4)
SODIUM SERPL-SCNC: 139 MMOL/L (ref 136–145)
WBC # BLD AUTO: 8.09 K/UL (ref 3.9–12.7)

## 2022-05-11 PROCEDURE — 25000003 PHARM REV CODE 250: Performed by: STUDENT IN AN ORGANIZED HEALTH CARE EDUCATION/TRAINING PROGRAM

## 2022-05-11 PROCEDURE — 83036 HEMOGLOBIN GLYCOSYLATED A1C: CPT | Performed by: STUDENT IN AN ORGANIZED HEALTH CARE EDUCATION/TRAINING PROGRAM

## 2022-05-11 PROCEDURE — 85025 COMPLETE CBC W/AUTO DIFF WBC: CPT | Performed by: STUDENT IN AN ORGANIZED HEALTH CARE EDUCATION/TRAINING PROGRAM

## 2022-05-11 PROCEDURE — 63600175 PHARM REV CODE 636 W HCPCS: Performed by: STUDENT IN AN ORGANIZED HEALTH CARE EDUCATION/TRAINING PROGRAM

## 2022-05-11 PROCEDURE — 80048 BASIC METABOLIC PNL TOTAL CA: CPT | Performed by: STUDENT IN AN ORGANIZED HEALTH CARE EDUCATION/TRAINING PROGRAM

## 2022-05-11 PROCEDURE — 36592 COLLECT BLOOD FROM PICC: CPT

## 2022-05-11 PROCEDURE — 96365 THER/PROPH/DIAG IV INF INIT: CPT

## 2022-05-11 PROCEDURE — 82570 ASSAY OF URINE CREATININE: CPT | Performed by: STUDENT IN AN ORGANIZED HEALTH CARE EDUCATION/TRAINING PROGRAM

## 2022-05-11 RX ORDER — HEPARIN 100 UNIT/ML
500 SYRINGE INTRAVENOUS
Status: CANCELLED | OUTPATIENT
Start: 2022-05-18

## 2022-05-11 RX ORDER — SODIUM CHLORIDE 0.9 % (FLUSH) 0.9 %
10 SYRINGE (ML) INJECTION
Status: CANCELLED | OUTPATIENT
Start: 2022-05-18

## 2022-05-11 RX ADMIN — IRON SUCROSE 200 MG: 20 INJECTION, SOLUTION INTRAVENOUS at 01:05

## 2022-05-11 NOTE — PROGRESS NOTES
Labs drawn before IV started and sent to lab, urine cup given to collect urine sample.  Ginger well

## 2022-05-12 ENCOUNTER — TELEPHONE (OUTPATIENT)
Dept: FAMILY MEDICINE | Facility: CLINIC | Age: 37
End: 2022-05-12
Payer: MEDICAID

## 2022-05-12 DIAGNOSIS — D50.9 IRON DEFICIENCY ANEMIA, UNSPECIFIED IRON DEFICIENCY ANEMIA TYPE: ICD-10-CM

## 2022-05-12 DIAGNOSIS — E87.6 HYPOKALEMIA: Primary | ICD-10-CM

## 2022-05-12 DIAGNOSIS — D75.839 THROMBOCYTOSIS: ICD-10-CM

## 2022-05-12 LAB
ESTIMATED AVG GLUCOSE: 120 MG/DL (ref 68–131)
HBA1C MFR BLD: 5.8 % (ref 4–5.6)

## 2022-05-12 RX ORDER — POTASSIUM CHLORIDE 20 MEQ/1
20 TABLET, EXTENDED RELEASE ORAL 2 TIMES DAILY
Qty: 30 TABLET | Refills: 5 | Status: SHIPPED | OUTPATIENT
Start: 2022-05-12 | End: 2022-08-17 | Stop reason: SDUPTHER

## 2022-05-12 NOTE — PROGRESS NOTES
Please inform the pt recent labs show persistent anemia and elevated platelet count. I placed a referral to hem/onc for further evaluation of her elevated platelets. Also, she had low potassium and I ordered a potassium supplement and would like to recheck her potassium level in 1 week after starting, order placed.   MB

## 2022-05-12 NOTE — TELEPHONE ENCOUNTER
----- Message from Raad Orta MD sent at 5/12/2022  3:08 PM CDT -----  Please inform the pt recent labs show persistent anemia and elevated platelet count. I placed a referral to hem/onc for further evaluation of her elevated platelets. Also, she had low potassium and I ordered a potassium supplement and would like to r  echeck her potassium level in 1 week after starting, order placed.   MB

## 2022-05-18 ENCOUNTER — INFUSION (OUTPATIENT)
Dept: INFUSION THERAPY | Facility: HOSPITAL | Age: 37
End: 2022-05-18
Attending: STUDENT IN AN ORGANIZED HEALTH CARE EDUCATION/TRAINING PROGRAM
Payer: MEDICAID

## 2022-05-18 VITALS
HEART RATE: 93 BPM | RESPIRATION RATE: 18 BRPM | SYSTOLIC BLOOD PRESSURE: 98 MMHG | DIASTOLIC BLOOD PRESSURE: 58 MMHG | TEMPERATURE: 98 F

## 2022-05-18 DIAGNOSIS — D50.9 IRON DEFICIENCY ANEMIA, UNSPECIFIED IRON DEFICIENCY ANEMIA TYPE: Primary | ICD-10-CM

## 2022-05-18 DIAGNOSIS — E61.1 IRON DEFICIENCY: ICD-10-CM

## 2022-05-18 PROCEDURE — 96365 THER/PROPH/DIAG IV INF INIT: CPT

## 2022-05-18 PROCEDURE — 63600175 PHARM REV CODE 636 W HCPCS: Performed by: STUDENT IN AN ORGANIZED HEALTH CARE EDUCATION/TRAINING PROGRAM

## 2022-05-18 PROCEDURE — 25000003 PHARM REV CODE 250: Performed by: STUDENT IN AN ORGANIZED HEALTH CARE EDUCATION/TRAINING PROGRAM

## 2022-05-18 RX ORDER — HEPARIN 100 UNIT/ML
500 SYRINGE INTRAVENOUS
Status: CANCELLED | OUTPATIENT
Start: 2022-05-25

## 2022-05-18 RX ORDER — SODIUM CHLORIDE 0.9 % (FLUSH) 0.9 %
10 SYRINGE (ML) INJECTION
Status: CANCELLED | OUTPATIENT
Start: 2022-05-25

## 2022-05-18 RX ADMIN — IRON SUCROSE 200 MG: 20 INJECTION, SOLUTION INTRAVENOUS at 01:05

## 2022-05-19 ENCOUNTER — TELEPHONE (OUTPATIENT)
Dept: FAMILY MEDICINE | Facility: CLINIC | Age: 37
End: 2022-05-19
Payer: MEDICAID

## 2022-05-19 DIAGNOSIS — E11.9 TYPE 2 DIABETES MELLITUS WITHOUT COMPLICATION, WITHOUT LONG-TERM CURRENT USE OF INSULIN: Primary | ICD-10-CM

## 2022-05-19 RX ORDER — LANCETS
EACH MISCELLANEOUS
Qty: 100 EACH | Refills: 11 | Status: SHIPPED | OUTPATIENT
Start: 2022-05-19 | End: 2023-02-07 | Stop reason: SDUPTHER

## 2022-05-19 NOTE — TELEPHONE ENCOUNTER
----- Message from Nancy Ramos sent at 5/19/2022 10:42 AM CDT -----  Contact: self  Pt called stating she is in need of some lancets to test her sugar.  She is also asking that a nurse call her as well, questions about meds.    [facility]    Phone:  [phone]

## 2022-05-19 NOTE — TELEPHONE ENCOUNTER
Patient states that she is needing lancets and strips to take her sugars - uses accucheck. Patient also states the flash glucose sensor (FREESTYLE SANDRA 14 DAY SENSOR) Kit was sent to [facility] and need to go to [facility] here.     Please advise. Patient has all other medications pending in separate msg. Please order lancets

## 2022-05-19 NOTE — TELEPHONE ENCOUNTER
Her insurance will not pay for strips/lancets etc AND a freestyle clifton. It will have to be one or the other.   MB

## 2022-05-21 ENCOUNTER — PATIENT MESSAGE (OUTPATIENT)
Dept: FAMILY MEDICINE | Facility: CLINIC | Age: 37
End: 2022-05-21
Payer: MEDICAID

## 2022-06-08 ENCOUNTER — PATIENT OUTREACH (OUTPATIENT)
Dept: ADMINISTRATIVE | Facility: HOSPITAL | Age: 37
End: 2022-06-08
Payer: MEDICAID

## 2022-06-22 DIAGNOSIS — E11.9 TYPE 2 DIABETES MELLITUS WITHOUT COMPLICATION: ICD-10-CM

## 2022-06-30 DIAGNOSIS — E78.5 HYPERLIPIDEMIA, UNSPECIFIED HYPERLIPIDEMIA TYPE: ICD-10-CM

## 2022-06-30 NOTE — TELEPHONE ENCOUNTER
Care Due:                  Date            Visit Type   Department     Provider  --------------------------------------------------------------------------------                                MYCHART                              ANNUAL                              CHECKUP/PHY  [facility]  Last Visit: 12-      [facility]       Raad Orta  Next Visit: None Scheduled  None         None Found                                                            Last  Test          Frequency    Reason                     Performed    Due Date  --------------------------------------------------------------------------------    Lipid Panel.  12 months..  atorvastatin.............  06- 06-    [facility] Embedded Care Gaps. Reference number: [id number]. 6/30/2022   5:00:42 PM CDT

## 2022-07-04 RX ORDER — ATORVASTATIN CALCIUM 20 MG/1
TABLET, FILM COATED ORAL
Qty: 30 TABLET | Refills: 5 | OUTPATIENT
Start: 2022-07-04

## 2022-07-04 NOTE — TELEPHONE ENCOUNTER
Refill Decision Note   Ethel Glass  is requesting a refill authorization.  Brief Assessment and Rationale for Refill:  Quick Discontinue    -Medication-Related Problems Identified:   Requires labs  Dose adjustment  Medication Therapy Plan:       Medication Reconciliation Completed: No   Comments:     Provider Staff:     Action is required for this patient.   Please see care gap opportunities below in Care Due Message.     Thanks!  [facility]     Appointments      Date Provider   Last Visit   12/28/2021 Raad Orta MD   Next Visit   7/3/2022 Raad Orta MD     Note composed:9:30 AM 07/04/2022           Note composed:9:29 AM 07/04/2022

## 2022-07-05 DIAGNOSIS — J40 BRONCHITIS: Primary | ICD-10-CM

## 2022-07-05 RX ORDER — ALBUTEROL SULFATE 5 MG/ML
2.5 SOLUTION RESPIRATORY (INHALATION) EVERY 6 HOURS PRN
Qty: 60 ML | Refills: 1 | OUTPATIENT
Start: 2022-07-05

## 2022-07-05 RX ORDER — ALBUTEROL SULFATE 0.83 MG/ML
2.5 SOLUTION RESPIRATORY (INHALATION) EVERY 6 HOURS PRN
Qty: 30 ML | Refills: 2 | Status: SHIPPED | OUTPATIENT
Start: 2022-07-05 | End: 2022-11-03

## 2022-07-05 NOTE — TELEPHONE ENCOUNTER
Refill Routing Note   Medication(s) are not appropriate for processing by [facility] for the following reason(s):      - plain albuterol vs albuterol with saline in nebulizer solution form    [facility] action(s):  Defer       Medication Therapy Plan: pharmacy requesting change of medication; defer to pcp  Medication reconciliation completed: No     Appointments  past 12m or future 3m with PCP    Date Provider   Last Visit   12/28/2021 Raad Orta MD   Next Visit   7/20/2022 Raad Orta MD   ED visits in past 90 days: 0        Note composed:3:45 PM 07/05/2022

## 2022-07-05 NOTE — TELEPHONE ENCOUNTER
No new care gaps identified.  [facility] Embedded Care Gaps. Reference number: [id number]. 7/05/2022   10:15:49 AM YOLANDAT

## 2022-07-11 ENCOUNTER — PATIENT MESSAGE (OUTPATIENT)
Dept: ADMINISTRATIVE | Facility: HOSPITAL | Age: 37
End: 2022-07-11
Payer: MEDICAID

## 2022-07-13 ENCOUNTER — HOSPITAL ENCOUNTER (OUTPATIENT)
Dept: RADIOLOGY | Facility: HOSPITAL | Age: 37
Discharge: HOME | End: 2022-07-13
Attending: PHYSICIAN ASSISTANT
Payer: MEDICAID

## 2022-07-13 DIAGNOSIS — M85.652 BONE CYST OF LEFT FEMUR: ICD-10-CM

## 2022-07-13 DIAGNOSIS — M25.551 RIGHT HIP PAIN: ICD-10-CM

## 2022-07-13 PROCEDURE — 73700 CT LOWER EXTREMITY W/O DYE: CPT | Mod: TC,RT

## 2022-07-13 PROCEDURE — 73700 CT HIP WITHOUT CONTRAST RIGHT: ICD-10-PCS | Mod: 26,RT,, | Performed by: RADIOLOGY

## 2022-07-13 PROCEDURE — 73700 CT LOWER EXTREMITY W/O DYE: CPT | Mod: 26,RT,, | Performed by: RADIOLOGY

## 2022-08-03 ENCOUNTER — INFUSION (OUTPATIENT)
Dept: INFUSION THERAPY | Facility: HOSPITAL | Age: 37
End: 2022-08-03
Attending: STUDENT IN AN ORGANIZED HEALTH CARE EDUCATION/TRAINING PROGRAM
Payer: MEDICAID

## 2022-08-03 VITALS
HEART RATE: 111 BPM | DIASTOLIC BLOOD PRESSURE: 88 MMHG | TEMPERATURE: 98 F | SYSTOLIC BLOOD PRESSURE: 138 MMHG | RESPIRATION RATE: 20 BRPM

## 2022-08-03 DIAGNOSIS — E61.1 IRON DEFICIENCY: ICD-10-CM

## 2022-08-03 DIAGNOSIS — D50.9 IRON DEFICIENCY ANEMIA, UNSPECIFIED IRON DEFICIENCY ANEMIA TYPE: Primary | ICD-10-CM

## 2022-08-03 PROCEDURE — 96365 THER/PROPH/DIAG IV INF INIT: CPT

## 2022-08-03 PROCEDURE — 63600175 PHARM REV CODE 636 W HCPCS: Performed by: STUDENT IN AN ORGANIZED HEALTH CARE EDUCATION/TRAINING PROGRAM

## 2022-08-03 PROCEDURE — 25000003 PHARM REV CODE 250: Performed by: STUDENT IN AN ORGANIZED HEALTH CARE EDUCATION/TRAINING PROGRAM

## 2022-08-03 RX ORDER — HEPARIN 100 UNIT/ML
500 SYRINGE INTRAVENOUS
Status: CANCELLED | OUTPATIENT
Start: 2022-08-10

## 2022-08-03 RX ORDER — SODIUM CHLORIDE 0.9 % (FLUSH) 0.9 %
10 SYRINGE (ML) INJECTION
Status: CANCELLED | OUTPATIENT
Start: 2022-08-10

## 2022-08-03 RX ADMIN — IRON SUCROSE 200 MG: 20 INJECTION, SOLUTION INTRAVENOUS at 12:08

## 2022-08-26 ENCOUNTER — INFUSION (OUTPATIENT)
Dept: INFUSION THERAPY | Facility: HOSPITAL | Age: 37
End: 2022-08-26
Attending: STUDENT IN AN ORGANIZED HEALTH CARE EDUCATION/TRAINING PROGRAM
Payer: MEDICAID

## 2022-08-26 VITALS
TEMPERATURE: 98 F | HEART RATE: 81 BPM | SYSTOLIC BLOOD PRESSURE: 98 MMHG | DIASTOLIC BLOOD PRESSURE: 58 MMHG | RESPIRATION RATE: 18 BRPM

## 2022-08-26 DIAGNOSIS — D50.9 IRON DEFICIENCY ANEMIA, UNSPECIFIED IRON DEFICIENCY ANEMIA TYPE: Primary | ICD-10-CM

## 2022-08-26 DIAGNOSIS — E61.1 IRON DEFICIENCY: ICD-10-CM

## 2022-08-26 PROCEDURE — 25000003 PHARM REV CODE 250: Performed by: STUDENT IN AN ORGANIZED HEALTH CARE EDUCATION/TRAINING PROGRAM

## 2022-08-26 PROCEDURE — 63600175 PHARM REV CODE 636 W HCPCS: Performed by: STUDENT IN AN ORGANIZED HEALTH CARE EDUCATION/TRAINING PROGRAM

## 2022-08-26 PROCEDURE — 96365 THER/PROPH/DIAG IV INF INIT: CPT

## 2022-08-26 RX ORDER — SODIUM CHLORIDE 0.9 % (FLUSH) 0.9 %
10 SYRINGE (ML) INJECTION
Status: CANCELLED | OUTPATIENT
Start: 2022-08-31

## 2022-08-26 RX ORDER — HEPARIN 100 UNIT/ML
500 SYRINGE INTRAVENOUS
Status: CANCELLED | OUTPATIENT
Start: 2022-08-31

## 2022-08-26 RX ADMIN — IRON SUCROSE 200 MG: 20 INJECTION, SOLUTION INTRAVENOUS at 12:08

## 2022-10-03 ENCOUNTER — PATIENT MESSAGE (OUTPATIENT)
Dept: ADMINISTRATIVE | Facility: HOSPITAL | Age: 37
End: 2022-10-03
Payer: MEDICAID

## 2022-10-05 DIAGNOSIS — M85.051 FIBROUS DYSPLASIA (MONOSTOTIC), RIGHT THIGH: Primary | ICD-10-CM

## 2022-10-05 DIAGNOSIS — M84.651S PATHOLOGICAL FRACTURE OF RIGHT FEMUR DUE TO OTHER DISEASE, SEQUELA: ICD-10-CM

## 2022-10-07 ENCOUNTER — CLINICAL SUPPORT (OUTPATIENT)
Dept: REHABILITATION | Facility: HOSPITAL | Age: 37
End: 2022-10-07
Payer: MEDICAID

## 2022-10-07 DIAGNOSIS — M85.051 FIBROUS DYSPLASIA (MONOSTOTIC), RIGHT THIGH: Primary | ICD-10-CM

## 2022-10-07 DIAGNOSIS — M84.651S PATHOLOGICAL FRACTURE OF RIGHT FEMUR DUE TO OTHER DISEASE, SEQUELA: ICD-10-CM

## 2022-10-07 PROBLEM — M84.451A PATHOLOGICAL FRACTURE OF RIGHT FEMUR: Status: ACTIVE | Noted: 2022-10-07

## 2022-10-07 PROCEDURE — 97110 THERAPEUTIC EXERCISES: CPT | Mod: PN

## 2022-10-07 PROCEDURE — 97161 PT EVAL LOW COMPLEX 20 MIN: CPT | Mod: PN

## 2022-10-07 NOTE — PLAN OF CARE
[facility]   Physical Therapy Initial Evaluation     Date: 10/7/2022   Name: Ethel Glass  Clinic Number: [id number]    Therapy Diagnosis:   Encounter Diagnoses   Name Primary?    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, sequela      Physician: Warren Ferrell*    Physician Orders: PT Eval and Treat   Medical Diagnosis from Referral: Fibrous dysplasia (monostotic), right thigh  Evaluation Date: 10/7/2022  Authorization Period Expiration: 12/31/22  Plan of Care Expiration: 12/2/2022]  Progress Note Due: 10/31/22  Visit # / Visits authorized: 1/ pending   FOTO: 1/3    Precautions: Standard     Time In: 1:07PM  Time Out: 1:45PM  Total Appointment Time (timed & untimed codes): 38 minutes      SUBJECTIVE     Date of onset: 8/31/22    History of current condition - Tulio reports: She has had right hip surgery in August - she has not seen physical therapist since. She is still not allowed to take baths because her wound is still healing. She reports she is balancing well - but reports her strength and her limp is bothering her. She is off all pain meds. Still taking gabapentin and tylenol PRN. She is out of work - MD said she has no restrictions. She reports difficulty sleeping on the right side due to sensitivity. She has a rolling walker - but hasn't been using it.     Referring MD:   In brief, 37 y.o. female PMHx obesity, DM, iron deficiency who has had multiple surgeries on her right hip for what is believed to be monostotic fibrous dysplasia. She is now s/p Right hi/p HWR, curettage and grafting with prodense and short carbonfiber cephalomedullary nail on 8/31/22.     HPI:   37 y.o. 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 She was first seen in our clinic in 12/19 where she was noted to have  a lytic lesion in the right proximal femur which appeared to be prior fibrous dysplasia that had been stable. She was scheduled for HWR, curettage and grafting, and recon nail placement in  but canceled due to COVID pandemic. She said her groin pain had improved so she didn't follow up for 2 years. A week or so ago at [facility] she slipped and fell onto her right hip. She had immediate pain in the anterior groin. She was able to bear weight though had significant pain. She is still having groin pain when ranging the hip and worse with weight bearing, which is limiting her function. She denies any numbness, tingling, or motor loss to the RLE. She says this pain is different than pain she had in the past.    Falls: 1    Imaging, MRI studies, bone scan films    Prior Therapy: Yes  Social History: 5 steps to enter home, lives with their 2 boys and 1 girl  Occupation: None  Prior Level of Function: No restrictions  Current Level of Function: limping, sleeping, strength and limited balance    Pain:  Current 6/10, worst 10/10, best 0/10   Location: right quad    Description: stinging  Aggravating Factors: Bending, Walking, and Flexing  Easing Factors: pain medication    Non-Mechanical Origin:  Night Pain?  No  Hx of Cancer?  No  Trauma?  Yes  Sudden bowel/bladder changes?  No  Bone Density compromise?  No    Patients goals: improve walking and strength     Medical History:   Past Medical History:   Diagnosis Date    Bipolar 1 disorder     COPD (chronic obstructive pulmonary disease)     Diabetes mellitus     Diabetes mellitus, type 2     Fibrous dysplasia (monostotic), other site     right hip    GERD (gastroesophageal reflux disease)     Post traumatic stress disorder (PTSD)        Surgical History:   Ethel Galss  has a past surgical history that includes Hip surgery; Cholecystectomy;  section; and Wrist Left.    Medications:   Ethel has a current medication list which includes the following  prescription(s): accu-chek seb plus test strp, albuterol, alprazolam, atorvastatin, blood-glucose meter, buspirone, cetirizine, cyclobenzaprine, dextroamphetamine-amphetamine, ferrous sulfate, freestyle clifton 14 day sensor, fluticasone/vilanterol, glyxambi, lancets, linaclotide, metformin, pantoprazole, potassium chloride sa, quetiapine, spironolactone, tramadol, and [DISCONTINUED] rosuvastatin.    Allergies:   Review of patient's allergies indicates:   Allergen Reactions    Latex, natural rubber Hives    Pcn [penicillins] Hives        OBJECTIVE     MOVEMENT EXAMINATION (R)   Hip Flexion 114 deg increased pull on right quad   Hip Extension 10 deg   Hip internal rotation 21 deg painless   Hip external rotation  42  deg painless      LOWER EXTREMITY STRENGTH    Left Right   Knee Extension 4+/5 4-/5   Knee Flexion 4+/5 4-/5     Hip Flexion 4-/5 3/5   Hip Abduction 3+/5 3/5   Hip Extension 4-/5 3+/5   Hip ER 4-/5 4-/5   Hip IR 4-/5 4-/5   Heel Raise 5/5 5/5   Ankle Dorsiflexion 5/5 5/5     FLEXIBILITY   Hamstring  ~80 degs bilaterally   Prone Quadriceps 3-4 fists from buttocks   Hip Extension Limited to < 10 degs   Hip Internal Rotation Limited (see above)   Hip External Rotation (See above)    Piriformis Limited 50% bilaterally    Dorsiflexion Not Tested      PALPATION:  Patient reports primary pain region along right lateral hip down to level of the knee.    GAIT: Ethel ambulates with no assistive device with independently.     GAIT DEVIATIONS: Ethel displays decreased step length on right, wide base of support, limited hip extension right.     Limitation/Restriction for FOTO LEFS Survey    Therapist reviewed FOTO scores for Ethel Glass on 10/7/2022.   FOTO documents entered into [employer] - see Media section.    Limitation Score: 42.6%       TREATMENT     Total Treatment time (time-based codes) separate from Evaluation: 15 minutes      Tulio received the treatments listed below:      therapeutic exercises to  develop strength, endurance, ROM, flexibility, posture, and core stabilization for 23 minutes including:  Supine Hamstring Stretch with Strap - 3 sets - 30s hold  Modified Kendall Stretch - 3 sets - 30s hold  Supine Piriformis Stretch with Foot on Ground - 3 sets - 30s hold  Supine Single Knee to Chest Stretch - 3 sets - 30s hold  Supine Quad Set - 1 x daily -2 sets - 10 reps - 5 hold  Small Range Straight Leg Raise - 2 sets - 10 reps  Supine Bridge - 2 sets - 10 reps  Standing Hip Abduction with Counter Support -  2 sets - 10 reps  Heel Raises with Counter Support - 2 sets - 10 reps    PATIENT EDUCATION AND HOME EXERCISES     Education provided:   - Desensitization techniques to limit right hip and quad discomfort  - Pt/family was provided educational information, including: role of PT, goals for PT, scheduling - pt verbalized understanding. Discussed insurance limitations with pt.     Written Home Exercises Provided: yes. Exercises were reviewed and Tulio was able to demonstrate them prior to the end of the session.  Tulio demonstrated good  understanding of the education provided. See EMR under Patient Instructions for exercises provided during therapy sessions.    ASSESSMENT     Ethel is a 37 y.o. female referred to outpatient Physical Therapy with a medical diagnosis of s/p Right hi/p HWR, curettage and grafting with prodense and short carbonfiber cephalomedullary nail on 8/31/22. Patient is diagnosed with fibrous dysplasia and has had subsequent surgeries at [age], and 22. Patient presents with significant RLE weakness.    Medical necessity is demonstrated by the following IMPAIRMENTS/PROBLEMS:  -Decreased function (KIMBERLY)   -Increased pain (NPS)   -Decreased pain hip AROM   -Impaired standing posture  -Decreased LE strength (MMT)   -Decreased LE mobility   -Decreased core stability (plank)  -Decreased participation in regular exercise routine  - (+) TTP: right lateral hip    Intervention strategies designed to  address these impairments will be instrumental in achieving the stated functional goals, including her ability to safely perform mobility tasks. Based on the examination, the patient's rehabilitation potential to achieve functional goals is good.    Patient prognosis is Excellent.   Patient will benefit from skilled outpatient Physical Therapy to address the deficits stated above and in the chart below, provide patient /family education, and to maximize patientt's level of independence.     Plan of care discussed with patient: Yes  Patient's spiritual, cultural and educational needs considered and patient is agreeable to the plan of care and goals as stated below:     Anticipated Barriers for therapy: Type 2 DM, multiple right hip surgeries    Medical Necessity is demonstrated by the following  History  Co-morbidities and personal factors that may impact the plan of care Co-morbidities:   diabetes, difficulty sleeping, high BMI, and Bipolor     Personal Factors:   lifestyle     moderate   Examination  Body Structures and Functions, activity limitations and participation restrictions that may impact the plan of care Body Regions:   lower extremities    Body Systems:    gross symmetry  ROM  strength  gross coordinated movement  balance  gait  transfers  transitions    Participation Restrictions:   Patient cannot participate in pain free community activities.    Activity limitations:   Learning and applying knowledge  no deficits    General Tasks and Commands  no deficits    Communication  no deficits    Mobility  lifting and carrying objects  walking  moving around using equipment (WC)  using transportation (bus, train, plane, car)  driving (bike, car, motorcycle)    Self care  toileting    Domestic Life  shopping  cooking  doing house work (cleaning house, washing dishes, laundry)  assisting others         moderate   Clinical Presentation stable and uncomplicated low   Decision Making/ Complexity Score: low      Goals:  Short Term Goals: 4 weeks   1. Patient demonstrates independence with HEP.   2. Patient demonstrates independence with Postural Awareness.   3. Patient will improve lower extremity MMT 1/2 grade from intial evaluation  4. Patient will be able to preform 10 STS without increased pain in the right hip.    Long Term Goals: 8 weeks   1. Patient will be able to walk 1 miles without an increase in right hip pain in order to improve functional activities such as grocery shopping and community activities  2. Patient demonstrates increased strength BLE's to /5 or greater to improve tolerance to functional activities.   3. Patient demonstrates improved overall function per LEFS to >65.  4. Patient will report ability to sleep without disruptions due to RLE sensitivity.   5. Patient will walk without an antalgic gait pattern.     PLAN   Plan of care Certification: 10/7/2022 to 12/9/22.    Outpatient Physical Therapy 2 times weekly for 8 weeks to include the following interventions: Cervical/Lumbar Traction, Electrical Stimulation  , Gait Training, Manual Therapy, Moist Heat/ Ice, Neuromuscular Re-ed, Patient Education, Self Care, Therapeutic Activities, and Therapeutic Exercise.     Smita Obrien, PT    Pt may be seen by PTA as part of the rehabilitation team.     Therapist: Smita Obrien, PT  10/7/2022    I CERTIFY THE NEED FOR THESE SERVICES FURNISHED UNDER THIS PLAN OF TREATMENT AND WHILE UNDER MY CARE   Physician's comments:     Physician's Signature: ___________________________________________________

## 2022-10-11 ENCOUNTER — CLINICAL SUPPORT (OUTPATIENT)
Dept: REHABILITATION | Facility: HOSPITAL | Age: 37
End: 2022-10-11
Payer: MEDICAID

## 2022-10-11 DIAGNOSIS — M84.651S PATHOLOGICAL FRACTURE OF RIGHT FEMUR DUE TO OTHER DISEASE, SEQUELA: ICD-10-CM

## 2022-10-11 DIAGNOSIS — M85.051 FIBROUS DYSPLASIA (MONOSTOTIC), RIGHT THIGH: Primary | ICD-10-CM

## 2022-10-11 PROCEDURE — 97110 THERAPEUTIC EXERCISES: CPT | Mod: PN,CQ

## 2022-10-11 NOTE — PROGRESS NOTES
[facility]  Physical Therapy Daily Note     Name: Ethel Glass  Clinic Number: [id number]    Therapy Diagnosis:   Encounter Diagnoses   Name Primary?    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, sequela      Physician: Warren Ferrell*    Visit Date: 10/11/2022    Physician Orders: PT Eval and Treat   Medical Diagnosis from Referral: Fibrous dysplasia (monostotic), right thigh  Evaluation Date: 10/7/2022  Authorization Period Expiration: 12/31/22  Plan of Care Expiration: 12/2/2022]  Progress Note Due: 10/31/22  Visit # / Visits authorized: 2/20   FOTO: 1/3     Precautions: Standard        Visit #: 2 of 20  PTA Visit #: 1  Time In: 155pm (pt arrives late)  Time Out: 230pm  Total Billable Time: 35 minutes    Subjective     Pt reports: she did her exercises at home with little difficulties. She thinks it may be the weather changes that causes increase pain to R hip.   She was compliant with home exercise program.  Response to previous treatment: first treatment day  Functional change: R hip pain, altered gait pattern, weakness    Pain: 5/10  Location: R hip    Objective     Tulio received therapeutic exercises to develop strength, endurance, ROM, flexibility, posture, and core stabilization for 30 minutes including:  Quad sets with ball under knee, 2x10  Bridges, 2x10  SLR bilateral, 2x10  Supine hip adduction with ball squeezes, 2x10  Supine hip abduction with GTB, 2x10  Sit to stand from mat with no hands supports, 2x10     Tulio received the following manual therapy techniques: Joint mobilizations and Soft tissue Mobilization were applied to the: R hip for 0 minutes, including:  None today     Tulio participated in neuromuscular re-education activities to improve: Balance, Coordination, and Proprioception for 0 minutes. The following activities were included:  None today    Tulio participated in dynamic functional  therapeutic activities to improve functional performance for 0  minutes, including:  None today    Tulio participated in gait training to improve functional mobility and safety for 5 minutes, including:  Patient ambulated on level tiles with no A.D., focused on even step length, increase weight bearing through RLE, cues for reciprocal arm swings.     Home Exercises Provided and Patient Education Provided     Written Home Exercises Provided: yes.  Exercises were reviewed and Tulio was able to demonstrate them prior to the end of the session.  Tulio demonstrated good  understanding of the education provided.     Education provided:   - home exercise program     Assessment     Patient with a medical diagnosis of s/p Right hi/p HWR, curettage and grafting with prodense and short carbonfiber cephalomedullary nail on 8/31/22. Focused session on R hip strengthening, even weight distribution through both legs, and improve gait pattern. Provided cues for sit to stand with wide base of supports, hip hinge, and weight shifts towards Right side due to patient demonstrates Left sided deviation specifically transitioning from standing to sit. Patient appears motivated and wants to get stronger and to be able to improve her gait pattern. Will continue to progress as tolerated.     Tulio Is progressing slowly towards her goals.   Pt prognosis is Good.     Pt will continue to benefit from skilled outpatient physical therapy to address the deficits listed in the problem list box on initial evaluation, provide pt/family education and to maximize pt's level of independence in the home and community environment.     Pt's spiritual, cultural and educational needs considered and pt agreeable to plan of care and goals.     Anticipated barriers to physical therapy: Type 2 DM, multiple right hip surgeries    Goals:   Short Term Goals: 4 weeks   1. Patient demonstrates independence with HEP.   2. Patient demonstrates independence with Postural  Awareness.   3. Patient will improve lower extremity MMT 1/2 grade from intial evaluation  4. Patient will be able to preform 10 STS without increased pain in the right hip.     Long Term Goals: 8 weeks   1. Patient will be able to walk 1 miles without an increase in right hip pain in order to improve functional activities such as grocery shopping and community activities  2. Patient demonstrates increased strength BLE's to /5 or greater to improve tolerance to functional activities.   3. Patient demonstrates improved overall function per LEFS to >65.  4. Patient will report ability to sleep without disruptions due to RLE sensitivity.   5. Patient will walk without an antalgic gait pattern.     Plan   Plan of care Certification: 10/7/2022 to 12/9/22.     Outpatient Physical Therapy 2 times weekly for 8 weeks to include the following interventions: Cervical/Lumbar Traction, Electrical Stimulation  , Gait Training, Manual Therapy, Moist Heat/ Ice, Neuromuscular Re-ed, Patient Education, Self Care, Therapeutic Activities, and Therapeutic Exercise.       Pt may be seen by PTA as part of the rehabilitation team.     Lucila Haley PTA   10/11/2022

## 2022-10-17 ENCOUNTER — PATIENT MESSAGE (OUTPATIENT)
Dept: FAMILY MEDICINE | Facility: CLINIC | Age: 37
End: 2022-10-17
Payer: MEDICAID

## 2022-10-18 ENCOUNTER — CLINICAL SUPPORT (OUTPATIENT)
Dept: REHABILITATION | Facility: HOSPITAL | Age: 37
End: 2022-10-18
Payer: MEDICAID

## 2022-10-18 DIAGNOSIS — M84.651S PATHOLOGICAL FRACTURE OF RIGHT FEMUR DUE TO OTHER DISEASE, SEQUELA: ICD-10-CM

## 2022-10-18 DIAGNOSIS — M85.051 FIBROUS DYSPLASIA (MONOSTOTIC), RIGHT THIGH: Primary | ICD-10-CM

## 2022-10-18 PROCEDURE — 97110 THERAPEUTIC EXERCISES: CPT | Mod: PN

## 2022-10-18 NOTE — PROGRESS NOTES
[facility]  Physical Therapy Daily Note     Name: Ethel Glass  Clinic Number: [id number]    Therapy Diagnosis:   No diagnosis found.    Physician: Warren Ferrell*    Visit Date: 10/18/2022    Physician Orders: PT Eval and Treat   Medical Diagnosis from Referral: Fibrous dysplasia (monostotic), right thigh  Evaluation Date: 10/7/2022  Authorization Period Expiration: 12/31/22  Plan of Care Expiration: 12/2/2022]  Progress Note Due: 10/31/22  Visit # / Visits authorized: 3/20   FOTO: 1/3     Precautions: Standard      PTA Visit #: 1  Time In: 1:00 pm  Time Out: 1:45 pm  Total Billable Time: 45 minutes    Subjective     Pt reports: She has been able to sleep on her right side without an increased in sensitivity or pain. She still feels unstable and that her walking is off.   She was compliant with home exercise program.  Response to previous treatment: first treatment day  Functional change: R hip pain, altered gait pattern, weakness    Pain: 5/10  Location: R hip    Objective     Tulio received therapeutic exercises to develop strength, endurance, ROM, flexibility, posture, and core stabilization for 30 minutes including:  Quad sets with ball under knee, 2x10  Bridges, 2x10  SLR bilateral, 2x10  Supine hip adduction with ball squeezes, 2x10  Supine hip abduction with GTB, 2x10  Sit to stand from mat with no hands supports, 2x10   Clamshells 2x10  Sidelying Hip Abduction - unable to preform on right lower extremity     Tulio received the following manual therapy techniques: Joint mobilizations and Soft tissue Mobilization were applied to the: R hip for 0 minutes, including:  None today     Tulio participated in neuromuscular re-education activities to improve: Balance, Coordination, and Proprioception for 0 minutes. The following activities were included:  None today    Tulio participated in dynamic functional therapeutic activities to improve functional performance for 10  minutes,  including:  Recumbent Bike Level 3 for 5 minutes to improve strength and endurance - supervised  Resisted lateral stepping GTB     Tulio participated in gait training to improve functional mobility and safety for 5 minutes, including:  Patient ambulated on level tiles with no A.D., focused on even step length, increase weight bearing through RLE, cues for reciprocal arm swings.     Home Exercises Provided and Patient Education Provided     Written Home Exercises Provided: yes.  Exercises were reviewed and Tulio was able to demonstrate them prior to the end of the session.  Tulio demonstrated good  understanding of the education provided.     Education provided:   - home exercise program     Assessment   Patient demonstrates significant deconditioning on the recumbent bike requiring breaks due to being short of breath. Attempted sidelying hip abduction exercises - patient currernlty unable to initiate movement on right lower extremity in this position. She continues to walk with significant trendelenburg gait. Notable shift with sit to stand to the left - improves with verbal cueing.     Tulio Is progressing slowly towards her goals.   Pt prognosis is Good.     Pt will continue to benefit from skilled outpatient physical therapy to address the deficits listed in the problem list box on initial evaluation, provide pt/family education and to maximize pt's level of independence in the home and community environment.     Pt's spiritual, cultural and educational needs considered and pt agreeable to plan of care and goals.     Anticipated barriers to physical therapy: Type 2 DM, multiple right hip surgeries    Goals:   Short Term Goals: 4 weeks   1. Patient demonstrates independence with HEP.   2. Patient demonstrates independence with Postural Awareness.   3. Patient will improve lower extremity MMT 1/2 grade from intial evaluation  4. Patient will be able to preform 10 STS without increased pain in the right hip.     Long Term  Goals: 8 weeks   1. Patient will be able to walk 1 miles without an increase in right hip pain in order to improve functional activities such as grocery shopping and community activities  2. Patient demonstrates increased strength BLE's to /5 or greater to improve tolerance to functional activities.   3. Patient demonstrates improved overall function per LEFS to >65.  4. Patient will report ability to sleep without disruptions due to RLE sensitivity.   5. Patient will walk without an antalgic gait pattern.     Plan   Plan of care Certification: 10/7/2022 to 12/9/22.     Outpatient Physical Therapy 2 times weekly for 8 weeks to include the following interventions: Cervical/Lumbar Traction, Electrical Stimulation  , Gait Training, Manual Therapy, Moist Heat/ Ice, Neuromuscular Re-ed, Patient Education, Self Care, Therapeutic Activities, and Therapeutic Exercise.       Pt may be seen by PTA as part of the rehabilitation team.     Smita Obrien, PT   10/18/2022

## 2022-10-19 ENCOUNTER — PATIENT MESSAGE (OUTPATIENT)
Dept: ADMINISTRATIVE | Facility: HOSPITAL | Age: 37
End: 2022-10-19
Payer: MEDICAID

## 2022-10-20 ENCOUNTER — PATIENT MESSAGE (OUTPATIENT)
Dept: FAMILY MEDICINE | Facility: CLINIC | Age: 37
End: 2022-10-20
Payer: MEDICAID

## 2022-10-25 ENCOUNTER — CLINICAL SUPPORT (OUTPATIENT)
Dept: REHABILITATION | Facility: HOSPITAL | Age: 37
End: 2022-10-25
Payer: MEDICAID

## 2022-10-25 DIAGNOSIS — M85.051 FIBROUS DYSPLASIA (MONOSTOTIC), RIGHT THIGH: Primary | ICD-10-CM

## 2022-10-25 PROCEDURE — 97110 THERAPEUTIC EXERCISES: CPT | Mod: PN,CQ

## 2022-10-25 NOTE — PROGRESS NOTES
"  [facility]  Physical Therapy Daily Note     Name: Ethel Glass  Clinic Number: [id number]    Physician: Warren Ferrell*    Visit Date: 10/25/2022    Physician Orders: PT Eval and Treat   Medical Diagnosis from Referral: Fibrous dysplasia (monostotic), right thigh  Evaluation Date: 10/7/2022  Authorization Period Expiration: 12/31/22  Plan of Care Expiration: 12/2/2022]  Progress Note Due: 10/31/22  Visit # / Visits authorized: 4/20   FOTO: 1/3     Precautions: Standard      PTA Visit #: 2  Time In: 1320 pm  Time Out: 1419 pm  Total Billable Time: 59 minutes    Subjective     Pt reports: "its more of the limp today." Pt states "I haven't been sleeping very well." Pt contributes increase discomfort to the weather. Pt states she has been performing desensitization massages at home.   She was compliant with home exercise program.  Response to previous treatment: no significant issues stated. Pt reports ex's are working well for her.   Functional change: R hip pain, altered gait pattern, weakness    Pain: 6-7/10  Location: R hip    Objective     Tulio received therapeutic exercises to develop strength, endurance, ROM, flexibility, posture, and core stabilization for 51 minutes including:  Quad sets with ball under knee, 2x10  Bridges, 2x10  SLR bilateral, 2x10  Supine hip adduction with ball squeezes, 2x10  Supine hip abduction with GTB, 2x10  Sit to stand from mat with no hands supports, 2x10   Clamshells 2x10  Sidelying Hip Abduction - deferred  +standing closed chain & open chain hip abduction at bar 2x10  +CC step up onto 6" step working on QS and knee control 10x's     Tulio received the following manual therapy techniques: Joint mobilizations and Soft tissue Mobilization were applied to the: R hip for 0 minutes, including:  None today     Tulio participated in neuromuscular re-education activities to improve: Balance, Coordination, and Proprioception for 0 minutes. The following " activities were included:  None today    Tulio participated in dynamic functional therapeutic activities to improve functional performance for 5  minutes, including:  Recumbent Bike Level 3 for 5 minutes to improve strength and endurance - supervised - deferred  Resisted lateral stepping GTB 1x each way     Tulio participated in gait training to improve functional mobility and safety for 3 minutes, including:  Patient ambulated on TM at 0.6mph, focused on even step length, increase weight bearing through RLE, decreased trunk sway and R knee hyperextension w/ manual cueing to both areas.     Home Exercises Provided and Patient Education Provided     Written Home Exercises Provided: yes. Advised pt to continue with added CC ex's at home. Provided pt w/ handout via text (d/t printer issues) including pictures and word descriptions.   Exercises were reviewed and Tulio was able to demonstrate them prior to the end of the session.  Tulio demonstrated good  understanding of the education provided.     Education provided:   -desensitization technique progressions as tolerated at home   -added CC HEP     Assessment   She continues to walk with significant trendelenburg gait. Also continued notable shift with sit to stand to the left - midline improves with verbal cueing. Initiated training on TM to improve step timing. Provided manual cues at shoulder to decrease lateral body sway & also to R knee to avoid hyperextension. Pt able to improve both areas of gait deviation w/ manual cueing.     Tulio Is progressing slowly towards her goals.   Pt prognosis is Good.     Pt will continue to benefit from skilled outpatient physical therapy to address the deficits listed in the problem list box on initial evaluation, provide pt/family education and to maximize pt's level of independence in the home and community environment.     Pt's spiritual, cultural and educational needs considered and pt agreeable to plan of care and goals.      Anticipated barriers to physical therapy: Type 2 DM, multiple right hip surgeries    Goals:   Short Term Goals: 4 weeks   1. Patient demonstrates independence with HEP.   2. Patient demonstrates independence with Postural Awareness.   3. Patient will improve lower extremity MMT 1/2 grade from intial evaluation  4. Patient will be able to preform 10 STS without increased pain in the right hip.     Long Term Goals: 8 weeks   1. Patient will be able to walk 1 miles without an increase in right hip pain in order to improve functional activities such as grocery shopping and community activities  2. Patient demonstrates increased strength BLE's to /5 or greater to improve tolerance to functional activities.   3. Patient demonstrates improved overall function per LEFS to >65.  4. Patient will report ability to sleep without disruptions due to RLE sensitivity.   5. Patient will walk without an antalgic gait pattern.     Plan   Plan of care Certification: 10/7/2022 to 12/9/22.     Outpatient Physical Therapy 2 times weekly for 8 weeks to include the following interventions: Cervical/Lumbar Traction, Electrical Stimulation  , Gait Training, Manual Therapy, Moist Heat/ Ice, Neuromuscular Re-ed, Patient Education, Self Care, Therapeutic Activities, and Therapeutic Exercise.       Pt may be seen by PTA as part of the rehabilitation team.     Jemma Ríos, EMEKA   10/25/2022

## 2022-11-01 ENCOUNTER — CLINICAL SUPPORT (OUTPATIENT)
Dept: REHABILITATION | Facility: HOSPITAL | Age: 37
End: 2022-11-01
Payer: MEDICAID

## 2022-11-01 ENCOUNTER — CLINICAL SUPPORT (OUTPATIENT)
Dept: FAMILY MEDICINE | Facility: CLINIC | Age: 37
End: 2022-11-01
Payer: MEDICAID

## 2022-11-01 DIAGNOSIS — M84.651S PATHOLOGICAL FRACTURE OF RIGHT FEMUR DUE TO OTHER DISEASE, SEQUELA: ICD-10-CM

## 2022-11-01 DIAGNOSIS — E11.9 TYPE 2 DIABETES MELLITUS WITHOUT COMPLICATION, UNSPECIFIED WHETHER LONG TERM INSULIN USE: ICD-10-CM

## 2022-11-01 DIAGNOSIS — M85.051 FIBROUS DYSPLASIA (MONOSTOTIC), RIGHT THIGH: Primary | ICD-10-CM

## 2022-11-01 DIAGNOSIS — D50.9 IRON DEFICIENCY ANEMIA, UNSPECIFIED IRON DEFICIENCY ANEMIA TYPE: ICD-10-CM

## 2022-11-01 DIAGNOSIS — E11.9 TYPE 2 DIABETES MELLITUS WITHOUT COMPLICATION: Primary | ICD-10-CM

## 2022-11-01 LAB
ALBUMIN SERPL BCP-MCNC: 3.8 G/DL (ref 3.5–5.2)
ALBUMIN/CREAT UR: 27.1 UG/MG (ref 0–30)
ALP SERPL-CCNC: 138 U/L (ref 55–135)
ALT SERPL W/O P-5'-P-CCNC: 14 U/L (ref 10–44)
ANION GAP SERPL CALC-SCNC: 13 MMOL/L (ref 8–16)
AST SERPL-CCNC: 13 U/L (ref 10–40)
BASOPHILS # BLD AUTO: 0.07 K/UL (ref 0–0.2)
BASOPHILS NFR BLD: 1 % (ref 0–1.9)
BILIRUB SERPL-MCNC: 0.5 MG/DL (ref 0.1–1)
BUN SERPL-MCNC: 6 MG/DL (ref 6–20)
CALCIUM SERPL-MCNC: 9.4 MG/DL (ref 8.7–10.5)
CHLORIDE SERPL-SCNC: 104 MMOL/L (ref 95–110)
CHOLEST SERPL-MCNC: 224 MG/DL (ref 120–199)
CHOLEST/HDLC SERPL: 4.9 {RATIO} (ref 2–5)
CO2 SERPL-SCNC: 21 MMOL/L (ref 23–29)
CREAT SERPL-MCNC: 0.7 MG/DL (ref 0.5–1.4)
CREAT UR-MCNC: 77.4 MG/DL (ref 15–325)
DIFFERENTIAL METHOD: ABNORMAL
EOSINOPHIL # BLD AUTO: 0.1 K/UL (ref 0–0.5)
EOSINOPHIL NFR BLD: 0.8 % (ref 0–8)
ERYTHROCYTE [DISTWIDTH] IN BLOOD BY AUTOMATED COUNT: 15.9 % (ref 11.5–14.5)
EST. GFR  (NO RACE VARIABLE): >60 ML/MIN/1.73 M^2
GLUCOSE SERPL-MCNC: 93 MG/DL (ref 70–110)
HCT VFR BLD AUTO: 36.8 % (ref 37–48.5)
HDLC SERPL-MCNC: 46 MG/DL (ref 40–75)
HDLC SERPL: 20.5 % (ref 20–50)
HGB BLD-MCNC: 11.2 G/DL (ref 12–16)
IMM GRANULOCYTES # BLD AUTO: 0.02 K/UL (ref 0–0.04)
IMM GRANULOCYTES NFR BLD AUTO: 0.3 % (ref 0–0.5)
LDLC SERPL CALC-MCNC: 161.2 MG/DL (ref 63–159)
LYMPHOCYTES # BLD AUTO: 2.2 K/UL (ref 1–4.8)
LYMPHOCYTES NFR BLD: 30.3 % (ref 18–48)
MCH RBC QN AUTO: 23.7 PG (ref 27–31)
MCHC RBC AUTO-ENTMCNC: 30.4 G/DL (ref 32–36)
MCV RBC AUTO: 78 FL (ref 82–98)
MICROALBUMIN UR DL<=1MG/L-MCNC: 21 UG/ML
MONOCYTES # BLD AUTO: 0.8 K/UL (ref 0.3–1)
MONOCYTES NFR BLD: 11 % (ref 4–15)
NEUTROPHILS # BLD AUTO: 4.1 K/UL (ref 1.8–7.7)
NEUTROPHILS NFR BLD: 56.6 % (ref 38–73)
NONHDLC SERPL-MCNC: 178 MG/DL
NRBC BLD-RTO: 0 /100 WBC
PLATELET # BLD AUTO: 529 K/UL (ref 150–450)
PMV BLD AUTO: 9.1 FL (ref 9.2–12.9)
POTASSIUM SERPL-SCNC: 3.8 MMOL/L (ref 3.5–5.1)
PROT SERPL-MCNC: 7.5 G/DL (ref 6–8.4)
RBC # BLD AUTO: 4.73 M/UL (ref 4–5.4)
SODIUM SERPL-SCNC: 138 MMOL/L (ref 136–145)
TRIGL SERPL-MCNC: 84 MG/DL (ref 30–150)
WBC # BLD AUTO: 7.27 K/UL (ref 3.9–12.7)

## 2022-11-01 PROCEDURE — 82043 UR ALBUMIN QUANTITATIVE: CPT | Performed by: STUDENT IN AN ORGANIZED HEALTH CARE EDUCATION/TRAINING PROGRAM

## 2022-11-01 PROCEDURE — 85025 COMPLETE CBC W/AUTO DIFF WBC: CPT | Performed by: INTERNAL MEDICINE

## 2022-11-01 PROCEDURE — 82570 ASSAY OF URINE CREATININE: CPT | Performed by: STUDENT IN AN ORGANIZED HEALTH CARE EDUCATION/TRAINING PROGRAM

## 2022-11-01 PROCEDURE — 84466 ASSAY OF TRANSFERRIN: CPT | Performed by: INTERNAL MEDICINE

## 2022-11-01 PROCEDURE — 36415 COLL VENOUS BLD VENIPUNCTURE: CPT | Mod: PBBFAC

## 2022-11-01 PROCEDURE — 80061 LIPID PANEL: CPT | Performed by: STUDENT IN AN ORGANIZED HEALTH CARE EDUCATION/TRAINING PROGRAM

## 2022-11-01 PROCEDURE — 80053 COMPREHEN METABOLIC PANEL: CPT | Performed by: INTERNAL MEDICINE

## 2022-11-01 PROCEDURE — 82728 ASSAY OF FERRITIN: CPT | Performed by: INTERNAL MEDICINE

## 2022-11-01 PROCEDURE — 97110 THERAPEUTIC EXERCISES: CPT | Mod: PN,CQ

## 2022-11-01 PROCEDURE — 83036 HEMOGLOBIN GLYCOSYLATED A1C: CPT | Performed by: STUDENT IN AN ORGANIZED HEALTH CARE EDUCATION/TRAINING PROGRAM

## 2022-11-01 NOTE — PROGRESS NOTES
"[facility]  Physical Therapy Daily Treatment Note     Name: Ethel Glass  Clinic Number: [id number]    Therapy Diagnosis:        Encounter Diagnoses   Name Primary?    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 Patient inquired about getting a shoe lift and asked where can she get one.     She was compliant with home exercise program.  Response to previous treatment: no significant issues stated.  Functional change: R hip pain, altered gait pattern, weakness    Pain: 7-8/10 described as cramping pain due to pt reports she has been on her feet a lot  Location: R hip    Objective     Tulio received therapeutic exercises to develop strength, endurance, ROM, flexibility, posture, and core stabilization for 35 minutes including:  Quad sets with ball under knee, 3x10  Bridges, 2x10  SLR bilateral, 2x10  Supine hip adduction with ball squeezes, 3x10  Supine hip abduction with GTB, 2x10  Sit to stand from mat with no hands supports, 2x10   Clamshells 2x10  +standing closed chain & open chain hip abduction at bar 2x10  +CC step up onto 6" step working on QS and knee control 10x's     Tulio received the following manual therapy techniques: Joint mobilizations and Soft tissue Mobilization were applied to the: R hip for 0 minutes, including:  None " today     Tulio participated in neuromuscular re-education activities to improve: Balance, Coordination, and Proprioception for 0 minutes. The following activities were included:  None today    Tulio participated in dynamic functional therapeutic activities to improve functional performance for 5  minutes, including:  Recumbent Bike Level 3 for 5 minutes to improve strength and endurance - supervised  Resisted lateral stepping GTB 1x each way     Tulio participated in gait training to improve functional mobility and safety for 5 minutes, including:  Patient ambulated on TM at 0.6mph, focused on even step length, increase weight bearing through RLE, decreased trunk sway and R knee hyperextension w/ manual cueing to both areas.     Home Exercises Provided and Patient Education Provided     Written Home Exercises Provided: yes. Advised pt to continue with added CC ex's at home. Provided pt w/ handout via text (d/t printer issues) including pictures and word descriptions.   Exercises were reviewed and Tulio was able to demonstrate them prior to the end of the session.  Tulio demonstrated good  understanding of the education provided.     Education provided:   -desensitization technique progressions as tolerated at home   -added CC HEP     Assessment   Patient remains fairly weak in B hips, decrease weight bearing towards RLE, and altered gait pattern. Patient required max cueing for proper techniques and body midline orientation in sit to stand exercise. Use mirror as feedback, however, patient still demonstrates fair understanding and demonstration with this tasks. Will need to work on proper cues and use mirror for improved techniques. Patient required verbal cues and demonstration in gait training using heel-toe sequences specifically on LLE (pt showed flat foot and decrease ankle DF in stance phase). In general, patient continues to require cues for all exercises in order to improve her overall functional mobility.     Tulio Is  progressing slowly towards her goals.   Pt prognosis is Good.     Pt will continue to benefit from skilled outpatient physical therapy to address the deficits listed in the problem list box on initial evaluation, provide pt/family education and to maximize pt's level of independence in the home and community environment.     Pt's spiritual, cultural and educational needs considered and pt agreeable to plan of care and goals.     Anticipated barriers to physical therapy: Type 2 DM, multiple right hip surgeries    Goals:   Short Term Goals: 4 weeks   1. Patient demonstrates independence with HEP.   2. Patient demonstrates independence with Postural Awareness.   3. Patient will improve lower extremity MMT 1/2 grade from intial evaluation  4. Patient will be able to preform 10 STS without increased pain in the right hip.     Long Term Goals: 8 weeks   1. Patient will be able to walk 1 miles without an increase in right hip pain in order to improve functional activities such as grocery shopping and community activities  2. Patient demonstrates increased strength BLE's to /5 or greater to improve tolerance to functional activities.   3. Patient demonstrates improved overall function per LEFS to >65.  4. Patient will report ability to sleep without disruptions due to RLE sensitivity.   5. Patient will walk without an antalgic gait pattern.     Plan   Plan of care Certification: 10/7/2022 to 12/9/22.     Outpatient Physical Therapy 2 times weekly for 8 weeks to include the following interventions: Cervical/Lumbar Traction, Electrical Stimulation  , Gait Training, Manual Therapy, Moist Heat/ Ice, Neuromuscular Re-ed, Patient Education, Self Care, Therapeutic Activities, and Therapeutic Exercise.       Pt may be seen by PTA as part of the rehabilitation team.     Lucila Haley PTA   11/1/2022

## 2022-11-02 LAB
ESTIMATED AVG GLUCOSE: 111 MG/DL (ref 68–131)
FERRITIN SERPL-MCNC: 184 NG/ML (ref 20–300)
HBA1C MFR BLD: 5.5 % (ref 4–5.6)
IRON SERPL-MCNC: 23 UG/DL (ref 30–160)
SATURATED IRON: 5 % (ref 20–50)
TOTAL IRON BINDING CAPACITY: 431 UG/DL (ref 250–450)
TRANSFERRIN SERPL-MCNC: 291 MG/DL (ref 200–375)

## 2022-11-03 ENCOUNTER — PATIENT MESSAGE (OUTPATIENT)
Dept: ADMINISTRATIVE | Facility: HOSPITAL | Age: 37
End: 2022-11-03
Payer: MEDICAID

## 2022-11-03 ENCOUNTER — OFFICE VISIT (OUTPATIENT)
Dept: FAMILY MEDICINE | Facility: CLINIC | Age: 37
End: 2022-11-03
Payer: MEDICAID

## 2022-11-03 VITALS
BODY MASS INDEX: 35.62 KG/M2 | HEART RATE: 80 BPM | DIASTOLIC BLOOD PRESSURE: 72 MMHG | WEIGHT: 193.56 LBS | RESPIRATION RATE: 20 BRPM | SYSTOLIC BLOOD PRESSURE: 104 MMHG | HEIGHT: 62 IN

## 2022-11-03 DIAGNOSIS — R10.9 RIGHT FLANK PAIN: ICD-10-CM

## 2022-11-03 DIAGNOSIS — E78.2 MIXED HYPERLIPIDEMIA: ICD-10-CM

## 2022-11-03 DIAGNOSIS — E61.1 IRON DEFICIENCY: ICD-10-CM

## 2022-11-03 DIAGNOSIS — M54.50 CHRONIC MIDLINE LOW BACK PAIN, UNSPECIFIED WHETHER SCIATICA PRESENT: ICD-10-CM

## 2022-11-03 DIAGNOSIS — I10 ESSENTIAL HYPERTENSION: ICD-10-CM

## 2022-11-03 DIAGNOSIS — E11.9 TYPE 2 DIABETES MELLITUS WITHOUT COMPLICATION, WITHOUT LONG-TERM CURRENT USE OF INSULIN: Primary | ICD-10-CM

## 2022-11-03 DIAGNOSIS — Z23 NEEDS FLU SHOT: ICD-10-CM

## 2022-11-03 DIAGNOSIS — D50.9 IRON DEFICIENCY ANEMIA, UNSPECIFIED IRON DEFICIENCY ANEMIA TYPE: ICD-10-CM

## 2022-11-03 DIAGNOSIS — G89.29 CHRONIC MIDLINE LOW BACK PAIN, UNSPECIFIED WHETHER SCIATICA PRESENT: ICD-10-CM

## 2022-11-03 PROCEDURE — 99213 OFFICE O/P EST LOW 20 MIN: CPT | Mod: PBBFAC,25 | Performed by: STUDENT IN AN ORGANIZED HEALTH CARE EDUCATION/TRAINING PROGRAM

## 2022-11-03 PROCEDURE — 3008F PR BODY MASS INDEX (BMI) DOCUMENTED: ICD-10-PCS | Mod: CPTII,,, | Performed by: STUDENT IN AN ORGANIZED HEALTH CARE EDUCATION/TRAINING PROGRAM

## 2022-11-03 PROCEDURE — 3078F DIAST BP <80 MM HG: CPT | Mod: CPTII,,, | Performed by: STUDENT IN AN ORGANIZED HEALTH CARE EDUCATION/TRAINING PROGRAM

## 2022-11-03 PROCEDURE — 87086 URINE CULTURE/COLONY COUNT: CPT | Performed by: STUDENT IN AN ORGANIZED HEALTH CARE EDUCATION/TRAINING PROGRAM

## 2022-11-03 PROCEDURE — 81001 URINALYSIS AUTO W/SCOPE: CPT | Mod: PBBFAC | Performed by: STUDENT IN AN ORGANIZED HEALTH CARE EDUCATION/TRAINING PROGRAM

## 2022-11-03 PROCEDURE — 90686 IIV4 VACC NO PRSV 0.5 ML IM: CPT | Mod: PBBFAC

## 2022-11-03 PROCEDURE — 3061F PR NEG MICROALBUMINURIA RESULT DOCUMENTED/REVIEW: ICD-10-PCS | Mod: CPTII,,, | Performed by: STUDENT IN AN ORGANIZED HEALTH CARE EDUCATION/TRAINING PROGRAM

## 2022-11-03 PROCEDURE — 1159F MED LIST DOCD IN RCRD: CPT | Mod: CPTII,,, | Performed by: STUDENT IN AN ORGANIZED HEALTH CARE EDUCATION/TRAINING PROGRAM

## 2022-11-03 PROCEDURE — 3078F PR MOST RECENT DIASTOLIC BLOOD PRESSURE < 80 MM HG: ICD-10-PCS | Mod: CPTII,,, | Performed by: STUDENT IN AN ORGANIZED HEALTH CARE EDUCATION/TRAINING PROGRAM

## 2022-11-03 PROCEDURE — 3061F NEG MICROALBUMINURIA REV: CPT | Mod: CPTII,,, | Performed by: STUDENT IN AN ORGANIZED HEALTH CARE EDUCATION/TRAINING PROGRAM

## 2022-11-03 PROCEDURE — 1159F PR MEDICATION LIST DOCUMENTED IN MEDICAL RECORD: ICD-10-PCS | Mod: CPTII,,, | Performed by: STUDENT IN AN ORGANIZED HEALTH CARE EDUCATION/TRAINING PROGRAM

## 2022-11-03 PROCEDURE — 3074F SYST BP LT 130 MM HG: CPT | Mod: CPTII,,, | Performed by: STUDENT IN AN ORGANIZED HEALTH CARE EDUCATION/TRAINING PROGRAM

## 2022-11-03 PROCEDURE — 99999 PR PBB SHADOW E&M-EST. PATIENT-LVL III: CPT | Mod: PBBFAC,,, | Performed by: STUDENT IN AN ORGANIZED HEALTH CARE EDUCATION/TRAINING PROGRAM

## 2022-11-03 PROCEDURE — 99214 OFFICE O/P EST MOD 30 MIN: CPT | Mod: S$PBB,,, | Performed by: STUDENT IN AN ORGANIZED HEALTH CARE EDUCATION/TRAINING PROGRAM

## 2022-11-03 PROCEDURE — 3008F BODY MASS INDEX DOCD: CPT | Mod: CPTII,,, | Performed by: STUDENT IN AN ORGANIZED HEALTH CARE EDUCATION/TRAINING PROGRAM

## 2022-11-03 PROCEDURE — 99214 PR OFFICE/OUTPT VISIT, EST, LEVL IV, 30-39 MIN: ICD-10-PCS | Mod: S$PBB,,, | Performed by: STUDENT IN AN ORGANIZED HEALTH CARE EDUCATION/TRAINING PROGRAM

## 2022-11-03 PROCEDURE — 81000 URINALYSIS NONAUTO W/SCOPE: CPT | Mod: PBBFAC | Performed by: STUDENT IN AN ORGANIZED HEALTH CARE EDUCATION/TRAINING PROGRAM

## 2022-11-03 PROCEDURE — 96372 THER/PROPH/DIAG INJ SC/IM: CPT | Mod: PBBFAC

## 2022-11-03 PROCEDURE — 3066F PR DOCUMENTATION OF TREATMENT FOR NEPHROPATHY: ICD-10-PCS | Mod: CPTII,,, | Performed by: STUDENT IN AN ORGANIZED HEALTH CARE EDUCATION/TRAINING PROGRAM

## 2022-11-03 PROCEDURE — 3044F PR MOST RECENT HEMOGLOBIN A1C LEVEL <7.0%: ICD-10-PCS | Mod: CPTII,,, | Performed by: STUDENT IN AN ORGANIZED HEALTH CARE EDUCATION/TRAINING PROGRAM

## 2022-11-03 PROCEDURE — 3074F PR MOST RECENT SYSTOLIC BLOOD PRESSURE < 130 MM HG: ICD-10-PCS | Mod: CPTII,,, | Performed by: STUDENT IN AN ORGANIZED HEALTH CARE EDUCATION/TRAINING PROGRAM

## 2022-11-03 PROCEDURE — 3044F HG A1C LEVEL LT 7.0%: CPT | Mod: CPTII,,, | Performed by: STUDENT IN AN ORGANIZED HEALTH CARE EDUCATION/TRAINING PROGRAM

## 2022-11-03 PROCEDURE — 99999 PR PBB SHADOW E&M-EST. PATIENT-LVL III: ICD-10-PCS | Mod: PBBFAC,,, | Performed by: STUDENT IN AN ORGANIZED HEALTH CARE EDUCATION/TRAINING PROGRAM

## 2022-11-03 PROCEDURE — 3066F NEPHROPATHY DOC TX: CPT | Mod: CPTII,,, | Performed by: STUDENT IN AN ORGANIZED HEALTH CARE EDUCATION/TRAINING PROGRAM

## 2022-11-03 RX ORDER — CYCLOBENZAPRINE HCL 10 MG
10 TABLET ORAL NIGHTLY PRN
Qty: 15 TABLET | Refills: 2 | Status: SHIPPED | OUTPATIENT
Start: 2022-11-03 | End: 2022-12-13 | Stop reason: SDUPTHER

## 2022-11-03 RX ORDER — KETOROLAC TROMETHAMINE 30 MG/ML
30 INJECTION, SOLUTION INTRAMUSCULAR; INTRAVENOUS
Status: COMPLETED | OUTPATIENT
Start: 2022-11-03 | End: 2022-11-03

## 2022-11-03 RX ORDER — ROSUVASTATIN CALCIUM 40 MG/1
40 TABLET, COATED ORAL NIGHTLY
Qty: 90 TABLET | Refills: 1 | Status: SHIPPED | OUTPATIENT
Start: 2022-11-03 | End: 2023-02-07 | Stop reason: SDUPTHER

## 2022-11-03 RX ORDER — METFORMIN HYDROCHLORIDE 500 MG/1
500 TABLET ORAL 2 TIMES DAILY WITH MEALS
Qty: 180 TABLET | Refills: 1 | Status: SHIPPED | OUTPATIENT
Start: 2022-11-03 | End: 2023-02-07 | Stop reason: SDUPTHER

## 2022-11-03 RX ORDER — ALBUTEROL SULFATE 0.83 MG/ML
2.5 SOLUTION RESPIRATORY (INHALATION) EVERY 6 HOURS PRN
Qty: 30 ML | Refills: 2 | Status: SHIPPED | OUTPATIENT
Start: 2022-11-03 | End: 2022-12-13 | Stop reason: SDUPTHER

## 2022-11-03 RX ORDER — GABAPENTIN 300 MG/1
300 CAPSULE ORAL 2 TIMES DAILY
Qty: 60 CAPSULE | Refills: 5 | Status: SHIPPED | OUTPATIENT
Start: 2022-11-03 | End: 2022-12-13 | Stop reason: SDUPTHER

## 2022-11-03 RX ADMIN — KETOROLAC TROMETHAMINE 30 MG: 30 INJECTION, SOLUTION INTRAMUSCULAR at 05:11

## 2022-11-03 NOTE — PROGRESS NOTES
"Subjective:       Patient ID: Ethel Glass is a 37 y.o. female.    Chief Complaint: Follow-up (Pt here for checkup ) and Back Pain (Pt here for back pain on right lower back. States had surgery about two months ago on that hip. )    Pt here for follow-up    DM2: pt currently on metformin 500mg BID and glyxambi 25/5mg daily. Last A1c 5.5    HTN: pt on spironolactone 100mg daily.     HLD: pt states lipitor makes her sick so she has not been taking. Last     YURI: recent H&H 11.2/36.8. she completed venofer as ordered after last visit. Recent iron not any better.     Pt also with 2 days of right lower back pain. She denies inciting injury or trauma. Pt reports she had a right hip surgery at [facility] about 2 months ago. She thinks they "put a screw in". No dysuria or hematuria but reports strong odor to her urine.    Pt also states she was seeing Dr. Wade with hem/onc for elelvated platelets and anemia and is requesting a new referral.    Review of Systems   Constitutional:  Negative for chills and fever.   HENT:  Negative for congestion and sore throat.    Respiratory:  Negative for cough and shortness of breath.    Cardiovascular:  Negative for chest pain.   Gastrointestinal:  Negative for abdominal pain, diarrhea, nausea and vomiting.   Genitourinary:  Negative for dysuria and hematuria.   Musculoskeletal:  Positive for arthralgias and back pain.   Neurological:  Negative for dizziness, syncope and light-headedness.     Objective:      Physical Exam   Constitutional: She is oriented to person, place, and time. No distress.   HENT:   Head: Normocephalic and atraumatic.   Eyes: Conjunctivae are normal.   Cardiovascular: Normal rate, regular rhythm and normal heart sounds.   No murmur heard.  Pulmonary/Chest: Effort normal and breath sounds normal. No respiratory distress.   Musculoskeletal:      Comments: TTP right paralumbar region   Neurological: She is alert and oriented to person, place, and time. "   Protective Sensation (w/ 10 gram monofilament):  Right: Intact  Left: Intact    Visual Inspection:  Callus -  Bilateral, Dry Skin -  Bilateral, and Onychomycosis -  Bilateral    Pedal Pulses:   Right: Present  Left: Present    Posterior tibialis:   Right:Present  Left: Present       Psychiatric: Her behavior is normal. Mood normal.   Vitals reviewed.    Assessment:       1. Type 2 diabetes mellitus without complication, without long-term current use of insulin    2. Essential hypertension    3. Iron deficiency    4. Iron deficiency anemia, unspecified iron deficiency anemia type    5. Mixed hyperlipidemia    6. Right flank pain    7. Needs flu shot    8. Chronic midline low back pain, unspecified whether sciatica present        Plan:           1. Type 2 diabetes mellitus without complication, without long-term current use of insulin  -     empagliflozin-linagliptin (GLYXAMBI) 25-5 mg Tab; Take 1 tablet by mouth once daily.  Dispense: 30 tablet; Refill: 5  -     metFORMIN (GLUCOPHAGE) 500 MG tablet; Take 1 tablet (500 mg total) by mouth 2 (two) times daily with meals.  Dispense: 180 tablet; Refill: 1    2. Essential hypertension    3. Iron deficiency    4. Iron deficiency anemia, unspecified iron deficiency anemia type  -     Ambulatory referral/consult to Hematology / Oncology; Future; Expected date: 11/10/2022    5. Mixed hyperlipidemia  -     rosuvastatin (CRESTOR) 40 MG Tab; Take 1 tablet (40 mg total) by mouth every evening.  Dispense: 90 tablet; Refill: 1    6. Right flank pain  -     POCT URINE DIPSTICK WITH MICROSCOPE, AUTOMATED; Future; Expected date: 11/03/2022  -     POCT URINE SEDIMENT EXAM; Future; Expected date: 11/03/2022  -     Urine culture  -     ketorolac injection 30 mg    7. Needs flu shot  -     Influenza - Quadrivalent *Preferred* (6 months+) (PF)    8. Chronic midline low back pain, unspecified whether sciatica present  -     cyclobenzaprine (FLEXERIL) 10 MG tablet; Take 1 tablet (10 mg  total) by mouth nightly as needed for Muscle spasms.  Dispense: 15 tablet; Refill: 2  -     gabapentin (NEURONTIN) 300 MG capsule; Take 1 capsule (300 mg total) by mouth 2 (two) times daily.  Dispense: 60 capsule; Refill: 5    Other orders  -     albuterol (PROVENTIL) 2.5 mg /3 mL (0.083 %) nebulizer solution; Take 3 mLs (2.5 mg total) by nebulization every 6 (six) hours as needed for Wheezing or Shortness of Breath. Rescue  Dispense: 30 mL; Refill: 2     Check urine culture  Cont current meds, refills sent  Flu shot today  Change lipitor to crestor due to myalgias  Follow-up ortho as scheduled  RTC 3 months or sooner if needed

## 2022-11-05 LAB
BACTERIA UR CULT: NORMAL
BACTERIA UR CULT: NORMAL

## 2022-11-07 LAB
BACTERIA SPEC CULT: NORMAL /HPF
BILIRUB SERPL-MCNC: NORMAL MG/DL
BLOOD URINE, POC: 50
CASTS: NORMAL
COLOR, POC UA: YELLOW
CRYSTALS: NORMAL
GLUCOSE UR QL STRIP: NORMAL
KETONES UR QL STRIP: NORMAL
LEUKOCYTE ESTERASE URINE, POC: NORMAL
NITRITE, POC UA: NORMAL
PH, POC UA: 6
PROTEIN, POC: NORMAL
RBC CELLS COUNTED: NORMAL
SPECIFIC GRAVITY, POC UA: 1.02
UROBILINOGEN, POC UA: 1
WHITE BLOOD CELLS: NORMAL

## 2022-11-15 ENCOUNTER — CLINICAL SUPPORT (OUTPATIENT)
Dept: REHABILITATION | Facility: HOSPITAL | Age: 37
End: 2022-11-15
Payer: MEDICAID

## 2022-11-15 DIAGNOSIS — M85.051 FIBROUS DYSPLASIA (MONOSTOTIC), RIGHT THIGH: Primary | ICD-10-CM

## 2022-11-15 DIAGNOSIS — M84.651S PATHOLOGICAL FRACTURE OF RIGHT FEMUR DUE TO OTHER DISEASE, SEQUELA: ICD-10-CM

## 2022-11-15 PROCEDURE — 97110 THERAPEUTIC EXERCISES: CPT | Mod: PN

## 2022-11-15 NOTE — PLAN OF CARE
[facility]  Physical Therapy Plan of Care Note    Name: Ethel Glass  Clinic Number: [id number]    Therapy Diagnosis:   Encounter Diagnoses   Name Primary?    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, sequela      Physician: Warren Ferrell*    Visit Date: 11/15/2022    Physician Orders: PT Eval and Treat   Medical Diagnosis from Referral: Fibrous dysplasia (monostotic), right thigh  Evaluation Date: 10/7/2022  Authorization Period Expiration: 12/31/22  Plan of Care Expiration: 1/10/2023   Progress Note Due: 10/31/22  Visit # / Visits authorized: 5/20   FOTO: 2/TBD    Precautions: Standard and Fall    SUBJECTIVE     Update: Patient arrives after missing 14 days of therapy (3 canceled visits) and states she can only stay for 15 minutes. She states her kids have had the flu and she hasn't been able to come to therapy. She reports minimal improvement since starting therapy. She reports that her strength is some better but her walking is about the same. She reports her sensitivity has decreased and she is able to sleep through the night. She reports that her legs are uneven and she needs a new shoe lift. She reports she is still gaining weight.     OBJECTIVE     Update:   MOVEMENT EXAMINATION (R)   Hip Flexion 120 deg    Hip Extension 10 deg   Hip internal rotation 25 deg painless   Hip external rotation  60 deg painless            LOWER EXTREMITY STRENGTH     Left Right   Knee Extension 4+/5 4-/5   Knee Flexion 4+/5 4-/5      Hip Flexion 4/5 3+/5   Hip Abduction 3+/5 3/5   Hip Extension 4+/5 4/5   Hip ER 4-/5 4-/5   Hip IR 4-/5 4-/5   Heel Raise 5/5 5/5   Ankle Dorsiflexion 5/5 5/5          FLEXIBILITY   Hamstring  ~80 degs bilaterally   Prone Quadriceps 2-3 fists from buttocks   Hip Extension Limited to < 10 degs     Limitation/Restriction for FOTO LEFS Survey     Therapist reviewed FOTO scores for Ethel DUTTON  Quan on 10/7/2022.   FOTO documents entered into [employer] - see Media section.     Limitation Score: 42.6%    ASSESSMENT     Update: Ms. Glass has attended 5 out patient physical therapy to address right hip pain and weakness s/p s/p Right hi/p HWR, curettage and grafting with prodense and short carbonfiber cephalomedullary nail on 8/31/22. Patient has not attended therapy in the past 2 weeks due to her kids being sick. Patient demonstrates improvement in right hip ROM and pain free activity. However, minimal progress noted with lower extremity strength. She continues to have significant deficits in hip abduction weakness which is a large contributing factor to her trendelenburg gait. Patient reports she is active at home and completing her home exercise program. Patient would continue to benefit from skilled physical therapist to continue to address the above impairments.      Previous Short Term Goals Status:   1 met  New Short Term Goals Status:   3 ongoing  Long Term Goal Status: continue per initial plan of care.  Reasons for Recertification of Therapy:   continuation of plan of care.     GOALS  Short Term Goals: 4 weeks   1. Patient demonstrates independence with HEP. - goal progressing 11/15/2022  2. Patient demonstrates independence with Postural Awareness. - goal progressing 11/15/2022  3. Patient will improve lower extremity MMT 1/2 grade from intial evaluation - goal progressing 11/15/2022  4. Patient will be able to preform 10 STS without increased pain in the right hip. - goal met 11/15/2022     Long Term Goals: 8 weeks   1. Patient will be able to walk 1 miles without an increase in right hip pain in order to improve functional activities such as grocery shopping and community activities - goal progressing 11/15/2022  2. Patient demonstrates increased strength BLE's to 4+/5 or greater to improve tolerance to functional activities. - goal progressing 11/15/2022  3. Patient demonstrates improved overall  function per LEFS to >65. - goal progressing 11/15/2022  4. Patient will report ability to sleep without disruptions due to RLE sensitivity. - goal met 11/15/2022  5. Patient will walk without an antalgic gait pattern. - goal progressing 11/15/2022    PLAN     Updated Certification Period: 11/15/2022 to 1/10/2023    Recommended Treatment Plan: 2 times per week for 8 weeks:  Aquatic Therapy, Cervical/Lumbar Traction, Electrical Stimulation  , Gait Training, Manual Therapy, Moist Heat/ Ice, Neuromuscular Re-ed, Patient Education, Self Care, Therapeutic Activities, and Therapeutic Exercise  Other Recommendations: None    Smita Obrien, PT    I CERTIFY THE NEED FOR THESE SERVICES FURNISHED UNDER THIS PLAN OF TREATMENT AND WHILE UNDER MY CARE  Physician's comments:      Physician's Signature: ___________________________________________________

## 2022-11-15 NOTE — PROGRESS NOTES
PT/PTA met face to face to discuss pt's treatment plan and progress towards established goals. Pt will be seen by a physical therapist minimally every 6th visit or every 30 days.    Please see Updated Plan of Care dated 1/10/2022 for changes and updated goals.    Smita Obrien, PT

## 2022-11-22 ENCOUNTER — CLINICAL SUPPORT (OUTPATIENT)
Dept: REHABILITATION | Facility: HOSPITAL | Age: 37
End: 2022-11-22
Payer: MEDICAID

## 2022-11-22 DIAGNOSIS — M84.651S PATHOLOGICAL FRACTURE OF RIGHT FEMUR DUE TO OTHER DISEASE, SEQUELA: ICD-10-CM

## 2022-11-22 DIAGNOSIS — M85.051 FIBROUS DYSPLASIA (MONOSTOTIC), RIGHT THIGH: Primary | ICD-10-CM

## 2022-11-22 PROCEDURE — 97110 THERAPEUTIC EXERCISES: CPT | Mod: PN

## 2022-11-22 NOTE — PROGRESS NOTES
"[facility]  Physical Therapy Daily Treatment Note     Name: Ethel Glass  Clinic Number: [id number]    Therapy Diagnosis:        Encounter Diagnoses   Name Primary?    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, sequela      Physician: Warren Ferrell*    Visit Date: 11/22/2022    Physician Orders: PT Eval and Treat   Medical Diagnosis from Referral: Fibrous dysplasia (monostotic), right thigh  Evaluation Date: 10/7/2022  Authorization Period Expiration: 12/31/22  Plan of Care Expiration: 12/2/2022]  Progress Note Due: 10/31/22  Visit # / Visits authorized: 4/20   FOTO: 1/3     Precautions: Standard      PTA Visit #: 2  Time In: 2:05pm (patient arrives 20 minutes late)  Time Out: 2:30pm  Total Billable Time: 25 minutes    Subjective     Pt reports: She is doing her increased home exercise program. Limping is still bothering her    She was compliant with home exercise program.  Response to previous treatment: no significant issues stated.  Functional change: R hip pain, altered gait pattern, weakness    Pain: 4/10 described as cramping pain due to pt reports she has been on her feet a lot  Location: R hip    Objective     Tulio received therapeutic exercises to develop strength, endurance, ROM, flexibility, posture, and core stabilization for 25 minutes including:  Bridges, 3x10  SLR bilateral, 3x10 2#  Supine hip adduction with ball squeezes, 3x10  Side lying Hip Abduction 2x10 2#  Clamshells 3x10  Sit to stand from mat with no hands supports, 2x10 10#  Clamshells 3x10  +CC step up onto 6" step working on QS and knee control 10x's   Treadmill 3%  incline and 2.0 mph for 5 minutes,     NOT PREFORMED  Quad sets with ball under knee, 3x10  Tulio received the following manual therapy techniques: Joint mobilizations and Soft tissue Mobilization were applied to the: R hip for 0 minutes, including:  None today     Tulio " participated in neuromuscular re-education activities to improve: Balance, Coordination, and Proprioception for 0 minutes. The following activities were included:  None today    Tulio participated in dynamic functional therapeutic activities to improve functional performance for 0 minutes, including:  Recumbent Bike Level 3 for 5 minutes to improve strength and endurance - supervised  Resisted lateral stepping GTB 1x each way     Tulio participated in gait training to improve functional mobility and safety for 5 minutes, including:  Patient ambulated on TM at 0.6mph, focused on even step length, increase weight bearing through RLE, decreased trunk sway and R knee hyperextension w/ manual cueing to both areas.     Home Exercises Provided and Patient Education Provided     Written Home Exercises Provided: yes. Advised pt to continue with added CC ex's at home. Provided pt w/ handout via text (d/t printer issues) including pictures and word descriptions.   Exercises were reviewed and Tulio was able to demonstrate them prior to the end of the session.  Tulio demonstrated good  understanding of the education provided.     Education provided:   -desensitization technique progressions as tolerated at home   -added CC HEP     Assessment   Patient demonstrating improved tolerance to stregnthening. Right hip abduction remains very weak. She is demonstrating improved eccentric control with activity. She voices compliance with home exercise program.     Tulio Is progressing slowly towards her goals.   Pt prognosis is Good.     Pt will continue to benefit from skilled outpatient physical therapy to address the deficits listed in the problem list box on initial evaluation, provide pt/family education and to maximize pt's level of independence in the home and community environment.     Pt's spiritual, cultural and educational needs considered and pt agreeable to plan of care and goals.     Anticipated barriers to physical therapy: Type 2  DM, multiple right hip surgeries    Goals:   Short Term Goals: 4 weeks   1. Patient demonstrates independence with HEP.   2. Patient demonstrates independence with Postural Awareness.   3. Patient will improve lower extremity MMT 1/2 grade from intial evaluation  4. Patient will be able to preform 10 STS without increased pain in the right hip.     Long Term Goals: 8 weeks   1. Patient will be able to walk 1 miles without an increase in right hip pain in order to improve functional activities such as grocery shopping and community activities  2. Patient demonstrates increased strength BLE's to /5 or greater to improve tolerance to functional activities.   3. Patient demonstrates improved overall function per LEFS to >65.  4. Patient will report ability to sleep without disruptions due to RLE sensitivity.   5. Patient will walk without an antalgic gait pattern.     Plan   Plan of care Certification: 10/7/2022 to 12/9/22.     Outpatient Physical Therapy 2 times weekly for 8 weeks to include the following interventions: Cervical/Lumbar Traction, Electrical Stimulation  , Gait Training, Manual Therapy, Moist Heat/ Ice, Neuromuscular Re-ed, Patient Education, Self Care, Therapeutic Activities, and Therapeutic Exercise.       Pt may be seen by PTA as part of the rehabilitation team.     Smita Obrien, PT   11/22/2022

## 2022-11-29 ENCOUNTER — CLINICAL SUPPORT (OUTPATIENT)
Dept: REHABILITATION | Facility: HOSPITAL | Age: 37
End: 2022-11-29
Payer: MEDICAID

## 2022-11-29 DIAGNOSIS — M84.651S PATHOLOGICAL FRACTURE OF RIGHT FEMUR DUE TO OTHER DISEASE, SEQUELA: ICD-10-CM

## 2022-11-29 DIAGNOSIS — M85.051 FIBROUS DYSPLASIA (MONOSTOTIC), RIGHT THIGH: Primary | ICD-10-CM

## 2022-11-29 PROCEDURE — 97110 THERAPEUTIC EXERCISES: CPT | Mod: PN

## 2022-11-29 NOTE — PROGRESS NOTES
"[facility]  Physical Therapy Daily Treatment Note     Name: Ethel Glass  Clinic Number: [id number]    Therapy Diagnosis:        Encounter Diagnoses   Name Primary?    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, sequela      Physician: Warren Ferrell*    Visit Date: 11/29/2022    Physician Orders: PT Eval and Treat   Medical Diagnosis from Referral: Fibrous dysplasia (monostotic), right thigh  Evaluation Date: 10/7/2022  Authorization Period Expiration: 12/31/22  Plan of Care Expiration: 12/2/2022]  Progress Note Due: 10/31/22  Visit # / Visits authorized: 7/12  FOTO: 1/3     Precautions: Standard      PTA Visit #: 2  Time In: 1:40pm   Time Out: 2:30pm  Total Billable Time: 25 minutes    Subjective     Pt reports: her pain is really bad today. She was painting all weekend - she doesn't think this did it.     She was compliant with home exercise program.  Response to previous treatment: no significant issues stated.  Functional change: R hip pain, altered gait pattern, weakness    Pain: 8/10 described as cramping pain due to pt reports she has been on her feet a lot  Location: R hip    Objective     Tulio received therapeutic exercises to develop strength, endurance, ROM, flexibility, posture, and core stabilization for 25 minutes including:    Hamstring Stretch with Strap 3x30s   Kendall Stretch physical therapist provided OP for increased knee flexion  Quad Stretch in prone with strap 3x30s   Knee to Opposite Shoulder 3x30s  DKTC on Stability Ball 3x10  Bridges, 3x10  Supine hip adduction with ball squeezes, 3x10    DEFERRED TODAY SECONDARY TO PAIN  SLR bilateral, 3x10 2#  Side lying Hip Abduction 2x10 2#  Clamshells 3x10  Sit to stand from mat with no hands supports, 2x10 10#  Clamshells 3x10  +CC step up onto 6" step working on QS and knee control 2x10   Treadmill 3%  incline and 2.0 mph for 5 minutes,     NOT " PREFORMED  Quad sets with ball under knee, 3x10  Tulio received the following manual therapy techniques: Joint mobilizations and Soft tissue Mobilization were applied to the: R hip for 0 minutes, including:  None today     Tulio participated in neuromuscular re-education activities to improve: Balance, Coordination, and Proprioception for 0 minutes. The following activities were included:  None today    Tuilo participated in dynamic functional therapeutic activities to improve functional performance for 0 minutes, including:  Recumbent Bike Level 3 for 5 minutes to improve strength and endurance - supervised  Resisted lateral stepping GTB 1x each way     Tulio participated in gait training to improve functional mobility and safety for 5 minutes, including:  Patient ambulated on TM at 0.6mph, focused on even step length, increase weight bearing through RLE, decreased trunk sway and R knee hyperextension w/ manual cueing to both areas.     Home Exercises Provided and Patient Education Provided     Written Home Exercises Provided: yes. Advised pt to continue with added CC ex's at home. Provided pt w/ handout via text (d/t printer issues) including pictures and word descriptions.   Exercises were reviewed and Tulio was able to demonstrate them prior to the end of the session.  Tulio demonstrated good  understanding of the education provided.     Education provided:   -desensitization technique progressions as tolerated at home   -added CC HEP     Assessment   Patient reported to therapy with a large increase in pain. Patient very tender on right proximal quad and hip flexor. Session focused on hip stretching and gentle ROM. Updated home exercise program with stretching encouraged to preform daily and taper strengthening for a couple days.     Tulio Is progressing slowly towards her goals.   Pt prognosis is Good.     Pt will continue to benefit from skilled outpatient physical therapy to address the deficits listed in the problem list  box on initial evaluation, provide pt/family education and to maximize pt's level of independence in the home and community environment.     Pt's spiritual, cultural and educational needs considered and pt agreeable to plan of care and goals.     Anticipated barriers to physical therapy: Type 2 DM, multiple right hip surgeries    Goals:   Short Term Goals: 4 weeks   1. Patient demonstrates independence with HEP.   2. Patient demonstrates independence with Postural Awareness.   3. Patient will improve lower extremity MMT 1/2 grade from intial evaluation  4. Patient will be able to preform 10 STS without increased pain in the right hip.     Long Term Goals: 8 weeks   1. Patient will be able to walk 1 miles without an increase in right hip pain in order to improve functional activities such as grocery shopping and community activities  2. Patient demonstrates increased strength BLE's to /5 or greater to improve tolerance to functional activities.   3. Patient demonstrates improved overall function per LEFS to >65.  4. Patient will report ability to sleep without disruptions due to RLE sensitivity.   5. Patient will walk without an antalgic gait pattern.     Plan   Plan of care Certification: 10/7/2022 to 12/9/22.     Outpatient Physical Therapy 2 times weekly for 8 weeks to include the following interventions: Cervical/Lumbar Traction, Electrical Stimulation  , Gait Training, Manual Therapy, Moist Heat/ Ice, Neuromuscular Re-ed, Patient Education, Self Care, Therapeutic Activities, and Therapeutic Exercise.       Pt may be seen by PTA as part of the rehabilitation team.     Smita Obrien, PT   11/29/2022

## 2022-12-20 ENCOUNTER — TELEPHONE (OUTPATIENT)
Dept: FAMILY MEDICINE | Facility: CLINIC | Age: 37
End: 2022-12-20
Payer: MEDICAID

## 2023-01-09 ENCOUNTER — PATIENT MESSAGE (OUTPATIENT)
Dept: ADMINISTRATIVE | Facility: HOSPITAL | Age: 38
End: 2023-01-09
Payer: MEDICAID

## 2023-01-30 ENCOUNTER — PATIENT OUTREACH (OUTPATIENT)
Dept: ADMINISTRATIVE | Facility: HOSPITAL | Age: 38
End: 2023-01-30
Payer: MEDICAID

## 2023-01-30 NOTE — PROGRESS NOTES
Chart reviewed, immunization record updated.  No new results noted on [facility] or [facility] web site.  Care Everywhere updated.   Patient care coordination note  Upcoming PCP visit updated.  Next PCP visit 2/09/2023.  Attempted to contact patient to discuss eye exam, unable to contact. (Number calling is unreachable)  Message sent to patient through patient portal.

## 2023-02-01 DIAGNOSIS — E11.9 TYPE 2 DIABETES MELLITUS WITHOUT COMPLICATION, UNSPECIFIED WHETHER LONG TERM INSULIN USE: ICD-10-CM

## 2023-02-07 ENCOUNTER — PATIENT MESSAGE (OUTPATIENT)
Dept: FAMILY MEDICINE | Facility: CLINIC | Age: 38
End: 2023-02-07
Payer: MEDICAID

## 2023-02-07 ENCOUNTER — PATIENT MESSAGE (OUTPATIENT)
Dept: ADMINISTRATIVE | Facility: HOSPITAL | Age: 38
End: 2023-02-07
Payer: MEDICAID

## 2023-02-08 DIAGNOSIS — E11.9 TYPE 2 DIABETES MELLITUS WITHOUT COMPLICATION, WITHOUT LONG-TERM CURRENT USE OF INSULIN: ICD-10-CM

## 2023-02-08 RX ORDER — METFORMIN HYDROCHLORIDE 500 MG/1
500 TABLET ORAL 2 TIMES DAILY WITH MEALS
Qty: 180 TABLET | Refills: 1 | Status: SHIPPED | OUTPATIENT
Start: 2023-02-08 | End: 2023-08-17

## 2023-02-08 NOTE — TELEPHONE ENCOUNTER
No new care gaps identified.  [facility] Embedded Care Gaps. Reference number: [id number]. 2/08/2023   9:18:07 AM CST

## 2023-02-08 NOTE — TELEPHONE ENCOUNTER
----- Message from Nancy Ramos sent at 2023  8:28 AM CST -----  Contact: self  Ethel Glass  MRN: [id number]  : 1985  PCP: Raad Orta  Home Phone      [phone]  Work Phone      Not on file.  Mobile          [phone]  Mobile          [phone]  Home Phone      [phone]  Home Phone      Not on file.      MESSAGE:   Pt called stating her insurance is denying her pantoprazole (PROTONIX) 40 MG tablet and stating she either needs to see a GI doctor or complete paperwork for an approval for her to get it.  ALSO, script needs to go to [facility] in [place].    Pt requesting refill or new Rx.   Is this a refill or new RX:  refill  RX name and strength:   metFORMIN (GLUCOPHAGE) 500 MG tablet  Last office visit:   Is this a 30-day or 90-day RX:  180  Pharmacy name and location:  [facility]/[place]  Comments:      Phone:  [phone]

## 2023-02-08 NOTE — TELEPHONE ENCOUNTER
LOV: 11/03/22    Pt states that insurance is denying her Protonix requesting she has paperwork for approval or be referred to GI specialist.

## 2023-02-09 ENCOUNTER — OFFICE VISIT (OUTPATIENT)
Dept: FAMILY MEDICINE | Facility: CLINIC | Age: 38
End: 2023-02-09
Payer: MEDICAID

## 2023-02-09 VITALS
HEART RATE: 82 BPM | RESPIRATION RATE: 16 BRPM | WEIGHT: 191.81 LBS | DIASTOLIC BLOOD PRESSURE: 76 MMHG | BODY MASS INDEX: 35.3 KG/M2 | SYSTOLIC BLOOD PRESSURE: 109 MMHG | HEIGHT: 62 IN

## 2023-02-09 DIAGNOSIS — K21.9 GASTROESOPHAGEAL REFLUX DISEASE, UNSPECIFIED WHETHER ESOPHAGITIS PRESENT: ICD-10-CM

## 2023-02-09 DIAGNOSIS — E55.9 VITAMIN D DEFICIENCY: ICD-10-CM

## 2023-02-09 DIAGNOSIS — Z23 NEED FOR TD VACCINE: ICD-10-CM

## 2023-02-09 DIAGNOSIS — N30.00 ACUTE CYSTITIS WITHOUT HEMATURIA: ICD-10-CM

## 2023-02-09 DIAGNOSIS — D50.9 IRON DEFICIENCY ANEMIA, UNSPECIFIED IRON DEFICIENCY ANEMIA TYPE: ICD-10-CM

## 2023-02-09 DIAGNOSIS — R30.0 DYSURIA: Primary | ICD-10-CM

## 2023-02-09 DIAGNOSIS — E78.2 MIXED HYPERLIPIDEMIA: ICD-10-CM

## 2023-02-09 DIAGNOSIS — E11.9 TYPE 2 DIABETES MELLITUS WITHOUT COMPLICATION, WITHOUT LONG-TERM CURRENT USE OF INSULIN: ICD-10-CM

## 2023-02-09 DIAGNOSIS — I10 ESSENTIAL HYPERTENSION: ICD-10-CM

## 2023-02-09 LAB
BACTERIA SPEC CULT: ABNORMAL /HPF
BILIRUB SERPL-MCNC: ABNORMAL MG/DL
BLOOD URINE, POC: ABNORMAL
CASTS: ABNORMAL
COLOR, POC UA: ABNORMAL
CRYSTALS: ABNORMAL
GLUCOSE UR QL STRIP: ABNORMAL
KETONES UR QL STRIP: ABNORMAL
LEUKOCYTE ESTERASE URINE, POC: ABNORMAL
NITRITE, POC UA: ABNORMAL
PH, POC UA: 6
PROTEIN, POC: ABNORMAL
RBC CELLS COUNTED: ABNORMAL
SPECIFIC GRAVITY, POC UA: 1.01
UROBILINOGEN, POC UA: ABNORMAL
WHITE BLOOD CELLS: ABNORMAL

## 2023-02-09 PROCEDURE — 3008F PR BODY MASS INDEX (BMI) DOCUMENTED: ICD-10-PCS | Mod: CPTII,,, | Performed by: STUDENT IN AN ORGANIZED HEALTH CARE EDUCATION/TRAINING PROGRAM

## 2023-02-09 PROCEDURE — 81000 URINALYSIS NONAUTO W/SCOPE: CPT | Mod: PBBFAC | Performed by: STUDENT IN AN ORGANIZED HEALTH CARE EDUCATION/TRAINING PROGRAM

## 2023-02-09 PROCEDURE — 99999 PR PBB SHADOW E&M-EST. PATIENT-LVL V: CPT | Mod: PBBFAC,,, | Performed by: STUDENT IN AN ORGANIZED HEALTH CARE EDUCATION/TRAINING PROGRAM

## 2023-02-09 PROCEDURE — 3078F DIAST BP <80 MM HG: CPT | Mod: CPTII,,, | Performed by: STUDENT IN AN ORGANIZED HEALTH CARE EDUCATION/TRAINING PROGRAM

## 2023-02-09 PROCEDURE — 3078F PR MOST RECENT DIASTOLIC BLOOD PRESSURE < 80 MM HG: ICD-10-PCS | Mod: CPTII,,, | Performed by: STUDENT IN AN ORGANIZED HEALTH CARE EDUCATION/TRAINING PROGRAM

## 2023-02-09 PROCEDURE — 1159F MED LIST DOCD IN RCRD: CPT | Mod: CPTII,,, | Performed by: STUDENT IN AN ORGANIZED HEALTH CARE EDUCATION/TRAINING PROGRAM

## 2023-02-09 PROCEDURE — 3074F PR MOST RECENT SYSTOLIC BLOOD PRESSURE < 130 MM HG: ICD-10-PCS | Mod: CPTII,,, | Performed by: STUDENT IN AN ORGANIZED HEALTH CARE EDUCATION/TRAINING PROGRAM

## 2023-02-09 PROCEDURE — 87086 URINE CULTURE/COLONY COUNT: CPT | Performed by: STUDENT IN AN ORGANIZED HEALTH CARE EDUCATION/TRAINING PROGRAM

## 2023-02-09 PROCEDURE — 99215 OFFICE O/P EST HI 40 MIN: CPT | Mod: PBBFAC | Performed by: STUDENT IN AN ORGANIZED HEALTH CARE EDUCATION/TRAINING PROGRAM

## 2023-02-09 PROCEDURE — 99214 OFFICE O/P EST MOD 30 MIN: CPT | Mod: S$PBB,,, | Performed by: STUDENT IN AN ORGANIZED HEALTH CARE EDUCATION/TRAINING PROGRAM

## 2023-02-09 PROCEDURE — 3074F SYST BP LT 130 MM HG: CPT | Mod: CPTII,,, | Performed by: STUDENT IN AN ORGANIZED HEALTH CARE EDUCATION/TRAINING PROGRAM

## 2023-02-09 PROCEDURE — 99999 PR PBB SHADOW E&M-EST. PATIENT-LVL V: ICD-10-PCS | Mod: PBBFAC,,, | Performed by: STUDENT IN AN ORGANIZED HEALTH CARE EDUCATION/TRAINING PROGRAM

## 2023-02-09 PROCEDURE — 3008F BODY MASS INDEX DOCD: CPT | Mod: CPTII,,, | Performed by: STUDENT IN AN ORGANIZED HEALTH CARE EDUCATION/TRAINING PROGRAM

## 2023-02-09 PROCEDURE — 90714 TD VACC NO PRESV 7 YRS+ IM: CPT | Mod: PBBFAC

## 2023-02-09 PROCEDURE — 99214 PR OFFICE/OUTPT VISIT, EST, LEVL IV, 30-39 MIN: ICD-10-PCS | Mod: S$PBB,,, | Performed by: STUDENT IN AN ORGANIZED HEALTH CARE EDUCATION/TRAINING PROGRAM

## 2023-02-09 PROCEDURE — 1159F PR MEDICATION LIST DOCUMENTED IN MEDICAL RECORD: ICD-10-PCS | Mod: CPTII,,, | Performed by: STUDENT IN AN ORGANIZED HEALTH CARE EDUCATION/TRAINING PROGRAM

## 2023-02-09 PROCEDURE — 81001 URINALYSIS AUTO W/SCOPE: CPT | Mod: PBBFAC | Performed by: STUDENT IN AN ORGANIZED HEALTH CARE EDUCATION/TRAINING PROGRAM

## 2023-02-09 RX ORDER — NITROFURANTOIN 25; 75 MG/1; MG/1
100 CAPSULE ORAL 2 TIMES DAILY
Qty: 10 CAPSULE | Refills: 0 | Status: SHIPPED | OUTPATIENT
Start: 2023-02-09 | End: 2023-02-14

## 2023-02-09 NOTE — PROGRESS NOTES
Subjective:       Patient ID: Ethel Glass is a 37 y.o. female.    Chief Complaint: Follow-up    Pt here for follow-up    YURI: she is requesting another referral to hematology for YURI that has not responded to iron replacment orally or IV.     GERD: she was on protonix and has been for years. Was followed by GI in [place]. Recently medicaid has stopped paying for her protonix and she would like to establish with GI locally.     Pt also with urinary frequency for the last 3-4 days. No dysuria. No hematuria.     Review of Systems   Constitutional:  Positive for unexpected weight change. Negative for activity change.   HENT:  Negative for hearing loss, rhinorrhea and trouble swallowing.    Eyes:  Negative for discharge and visual disturbance.   Respiratory:  Negative for chest tightness and wheezing.    Cardiovascular:  Negative for chest pain and palpitations.   Gastrointestinal:  Negative for blood in stool, constipation, diarrhea and vomiting.   Endocrine: Negative for polydipsia and polyuria.   Genitourinary:  Positive for menstrual problem. Negative for difficulty urinating, dysuria and hematuria.   Musculoskeletal:  Positive for arthralgias. Negative for joint swelling and neck pain.   Neurological:  Negative for weakness and headaches.   Psychiatric/Behavioral:  Positive for dysphoric mood. Negative for confusion.      Objective:      Physical Exam   Constitutional: She is oriented to person, place, and time. No distress.   HENT:   Head: Normocephalic and atraumatic.   Eyes: Conjunctivae are normal.   Cardiovascular: Normal rate, regular rhythm and normal heart sounds.   No murmur heard.  Pulmonary/Chest: Effort normal and breath sounds normal. No respiratory distress.   Abdominal: Soft. Bowel sounds are normal. She exhibits no distension. There is abdominal tenderness (suprapubic).   Neurological: She is alert and oriented to person, place, and time.        Psychiatric: Her behavior is normal. Mood  normal.   Vitals reviewed.    Assessment:       1. Dysuria    2. Gastroesophageal reflux disease, unspecified whether esophagitis present    3. Mixed hyperlipidemia    4. Essential hypertension    5. Iron deficiency anemia, unspecified iron deficiency anemia type    6. Type 2 diabetes mellitus without complication, without long-term current use of insulin    7. Need for Td vaccine    8. Vitamin D deficiency    9. Acute cystitis without hematuria          Plan:           1. Dysuria  -     POCT Urine Sediment Exam  -     POCT URINE DIPSTICK WITH MICROSCOPE, AUTOMATED  -     CULTURE, URINE  -     Urine culture; Future; Expected date: 02/09/2023    2. Gastroesophageal reflux disease, unspecified whether esophagitis present  -     Ambulatory referral/consult to Gastroenterology; Future; Expected date: 02/16/2023    3. Mixed hyperlipidemia    4. Essential hypertension  -     CBC Auto Differential; Future; Expected date: 02/09/2023  -     Comprehensive Metabolic Panel; Future; Expected date: 02/09/2023    5. Iron deficiency anemia, unspecified iron deficiency anemia type  -     CBC Auto Differential; Future; Expected date: 02/09/2023    6. Type 2 diabetes mellitus without complication, without long-term current use of insulin  -     CBC Auto Differential; Future; Expected date: 02/09/2023  -     Comprehensive Metabolic Panel; Future; Expected date: 02/09/2023  -     Hemoglobin A1C; Future; Expected date: 02/09/2023    7. Need for Td vaccine  -     (In Office Administered) Td Vaccine    8. Vitamin D deficiency  -     Vitamin D; Future; Expected date: 05/10/2023    9. Acute cystitis without hematuria  -     nitrofurantoin, macrocrystal-monohydrate, (MACROBID) 100 MG capsule; Take 1 capsule (100 mg total) by mouth 2 (two) times daily. for 5 days  Dispense: 10 capsule; Refill: 0    Cont current meds  Macrobid; check urine culture  Td vaccine today  Refer GI per patient request  Will give number for previously placed referral  to hem/onc  RTC 3 months with labs prior

## 2023-02-11 LAB — BACTERIA UR CULT: NORMAL

## 2023-03-13 ENCOUNTER — PATIENT MESSAGE (OUTPATIENT)
Dept: FAMILY MEDICINE | Facility: CLINIC | Age: 38
End: 2023-03-13
Payer: MEDICAID

## 2023-03-14 ENCOUNTER — PATIENT MESSAGE (OUTPATIENT)
Dept: ADMINISTRATIVE | Facility: OTHER | Age: 38
End: 2023-03-14
Payer: MEDICAID

## 2023-03-16 ENCOUNTER — PATIENT OUTREACH (OUTPATIENT)
Dept: FAMILY MEDICINE | Facility: CLINIC | Age: 38
End: 2023-03-16
Payer: MEDICAID

## 2023-03-16 NOTE — TELEPHONE ENCOUNTER
Attempted to contact pt to see if device was received and complete enrollment at Dr. Orta's office.  VM full.

## 2023-03-23 ENCOUNTER — PATIENT OUTREACH (OUTPATIENT)
Dept: FAMILY MEDICINE | Facility: CLINIC | Age: 38
End: 2023-03-23
Payer: MEDICAID

## 2023-04-03 ENCOUNTER — PATIENT MESSAGE (OUTPATIENT)
Dept: ADMINISTRATIVE | Facility: HOSPITAL | Age: 38
End: 2023-04-03
Payer: MEDICAID

## 2023-04-27 ENCOUNTER — PATIENT OUTREACH (OUTPATIENT)
Dept: FAMILY MEDICINE | Facility: CLINIC | Age: 38
End: 2023-04-27
Payer: MEDICAID

## 2023-04-28 ENCOUNTER — PATIENT OUTREACH (OUTPATIENT)
Dept: FAMILY MEDICINE | Facility: CLINIC | Age: 38
End: 2023-04-28
Payer: MEDICAID

## 2023-04-28 NOTE — TELEPHONE ENCOUNTER
Pt returned call regarding digital med enrollment.  She states that she already was able to set up her device.  Inquired about completing enrollment with care team.  She mentioned that she did talk to someone about completing recently.

## 2023-05-02 ENCOUNTER — CLINICAL SUPPORT (OUTPATIENT)
Dept: FAMILY MEDICINE | Facility: CLINIC | Age: 38
End: 2023-05-02
Payer: MEDICAID

## 2023-05-02 DIAGNOSIS — I10 ESSENTIAL HYPERTENSION: ICD-10-CM

## 2023-05-02 DIAGNOSIS — R30.0 DYSURIA: ICD-10-CM

## 2023-05-02 DIAGNOSIS — D50.9 IRON DEFICIENCY ANEMIA, UNSPECIFIED IRON DEFICIENCY ANEMIA TYPE: ICD-10-CM

## 2023-05-02 DIAGNOSIS — E55.9 VITAMIN D DEFICIENCY: ICD-10-CM

## 2023-05-02 DIAGNOSIS — E11.9 TYPE 2 DIABETES MELLITUS WITHOUT COMPLICATION, WITHOUT LONG-TERM CURRENT USE OF INSULIN: ICD-10-CM

## 2023-05-02 LAB
ALBUMIN SERPL BCP-MCNC: 4 G/DL (ref 3.5–5.2)
ALP SERPL-CCNC: 100 U/L (ref 55–135)
ALT SERPL W/O P-5'-P-CCNC: 13 U/L (ref 10–44)
ANION GAP SERPL CALC-SCNC: 12 MMOL/L (ref 8–16)
AST SERPL-CCNC: 15 U/L (ref 10–40)
BASOPHILS # BLD AUTO: 0.04 K/UL (ref 0–0.2)
BASOPHILS NFR BLD: 0.5 % (ref 0–1.9)
BILIRUB SERPL-MCNC: 0.4 MG/DL (ref 0.1–1)
BUN SERPL-MCNC: 9 MG/DL (ref 6–20)
CALCIUM SERPL-MCNC: 9.7 MG/DL (ref 8.7–10.5)
CHLORIDE SERPL-SCNC: 106 MMOL/L (ref 95–110)
CO2 SERPL-SCNC: 23 MMOL/L (ref 23–29)
CREAT SERPL-MCNC: 0.7 MG/DL (ref 0.5–1.4)
DIFFERENTIAL METHOD: ABNORMAL
EOSINOPHIL # BLD AUTO: 0.2 K/UL (ref 0–0.5)
EOSINOPHIL NFR BLD: 2.1 % (ref 0–8)
ERYTHROCYTE [DISTWIDTH] IN BLOOD BY AUTOMATED COUNT: 14.6 % (ref 11.5–14.5)
EST. GFR  (NO RACE VARIABLE): >60 ML/MIN/1.73 M^2
GLUCOSE SERPL-MCNC: 91 MG/DL (ref 70–110)
HCT VFR BLD AUTO: 38.9 % (ref 37–48.5)
HGB BLD-MCNC: 11.6 G/DL (ref 12–16)
IMM GRANULOCYTES # BLD AUTO: 0.01 K/UL (ref 0–0.04)
IMM GRANULOCYTES NFR BLD AUTO: 0.1 % (ref 0–0.5)
LYMPHOCYTES # BLD AUTO: 2 K/UL (ref 1–4.8)
LYMPHOCYTES NFR BLD: 26.8 % (ref 18–48)
MCH RBC QN AUTO: 24.6 PG (ref 27–31)
MCHC RBC AUTO-ENTMCNC: 29.8 G/DL (ref 32–36)
MCV RBC AUTO: 82 FL (ref 82–98)
MONOCYTES # BLD AUTO: 0.7 K/UL (ref 0.3–1)
MONOCYTES NFR BLD: 9.6 % (ref 4–15)
NEUTROPHILS # BLD AUTO: 4.6 K/UL (ref 1.8–7.7)
NEUTROPHILS NFR BLD: 60.9 % (ref 38–73)
NRBC BLD-RTO: 0 /100 WBC
PLATELET # BLD AUTO: 530 K/UL (ref 150–450)
PMV BLD AUTO: 9.3 FL (ref 9.2–12.9)
POTASSIUM SERPL-SCNC: 3.8 MMOL/L (ref 3.5–5.1)
PROT SERPL-MCNC: 7.8 G/DL (ref 6–8.4)
RBC # BLD AUTO: 4.72 M/UL (ref 4–5.4)
SODIUM SERPL-SCNC: 141 MMOL/L (ref 136–145)
WBC # BLD AUTO: 7.58 K/UL (ref 3.9–12.7)

## 2023-05-02 PROCEDURE — 84466 ASSAY OF TRANSFERRIN: CPT | Performed by: STUDENT IN AN ORGANIZED HEALTH CARE EDUCATION/TRAINING PROGRAM

## 2023-05-02 PROCEDURE — 87086 URINE CULTURE/COLONY COUNT: CPT | Performed by: STUDENT IN AN ORGANIZED HEALTH CARE EDUCATION/TRAINING PROGRAM

## 2023-05-02 PROCEDURE — 80053 COMPREHEN METABOLIC PANEL: CPT | Performed by: STUDENT IN AN ORGANIZED HEALTH CARE EDUCATION/TRAINING PROGRAM

## 2023-05-02 PROCEDURE — 85025 COMPLETE CBC W/AUTO DIFF WBC: CPT | Performed by: STUDENT IN AN ORGANIZED HEALTH CARE EDUCATION/TRAINING PROGRAM

## 2023-05-02 PROCEDURE — 36415 COLL VENOUS BLD VENIPUNCTURE: CPT | Mod: PBBFAC

## 2023-05-02 PROCEDURE — 83036 HEMOGLOBIN GLYCOSYLATED A1C: CPT | Performed by: STUDENT IN AN ORGANIZED HEALTH CARE EDUCATION/TRAINING PROGRAM

## 2023-05-02 PROCEDURE — 82306 VITAMIN D 25 HYDROXY: CPT | Performed by: STUDENT IN AN ORGANIZED HEALTH CARE EDUCATION/TRAINING PROGRAM

## 2023-05-03 LAB
25(OH)D3+25(OH)D2 SERPL-MCNC: 15 NG/ML (ref 30–96)
BACTERIA UR CULT: NORMAL
BACTERIA UR CULT: NORMAL
ESTIMATED AVG GLUCOSE: 123 MG/DL (ref 68–131)
HBA1C MFR BLD: 5.9 % (ref 4–5.6)
IRON SERPL-MCNC: 44 UG/DL (ref 30–160)
SATURATED IRON: 11 % (ref 20–50)
TOTAL IRON BINDING CAPACITY: 403 UG/DL (ref 250–450)
TRANSFERRIN SERPL-MCNC: 272 MG/DL (ref 200–375)

## 2023-05-09 ENCOUNTER — OFFICE VISIT (OUTPATIENT)
Dept: FAMILY MEDICINE | Facility: CLINIC | Age: 38
End: 2023-05-09
Payer: MEDICAID

## 2023-05-09 VITALS
HEART RATE: 62 BPM | RESPIRATION RATE: 16 BRPM | SYSTOLIC BLOOD PRESSURE: 102 MMHG | HEIGHT: 62 IN | WEIGHT: 189.69 LBS | BODY MASS INDEX: 34.91 KG/M2 | DIASTOLIC BLOOD PRESSURE: 62 MMHG

## 2023-05-09 DIAGNOSIS — E11.9 TYPE 2 DIABETES MELLITUS WITHOUT COMPLICATION, WITHOUT LONG-TERM CURRENT USE OF INSULIN: ICD-10-CM

## 2023-05-09 DIAGNOSIS — D75.839 THROMBOCYTOSIS: ICD-10-CM

## 2023-05-09 DIAGNOSIS — Z23 NEED FOR PNEUMOCOCCAL VACCINATION: ICD-10-CM

## 2023-05-09 DIAGNOSIS — K21.9 GASTROESOPHAGEAL REFLUX DISEASE, UNSPECIFIED WHETHER ESOPHAGITIS PRESENT: ICD-10-CM

## 2023-05-09 DIAGNOSIS — D50.9 IRON DEFICIENCY ANEMIA, UNSPECIFIED IRON DEFICIENCY ANEMIA TYPE: ICD-10-CM

## 2023-05-09 DIAGNOSIS — E55.9 VITAMIN D DEFICIENCY: ICD-10-CM

## 2023-05-09 DIAGNOSIS — I10 ESSENTIAL HYPERTENSION: ICD-10-CM

## 2023-05-09 DIAGNOSIS — E78.2 MIXED HYPERLIPIDEMIA: Primary | ICD-10-CM

## 2023-05-09 PROCEDURE — 99214 OFFICE O/P EST MOD 30 MIN: CPT | Mod: S$PBB,,, | Performed by: STUDENT IN AN ORGANIZED HEALTH CARE EDUCATION/TRAINING PROGRAM

## 2023-05-09 PROCEDURE — 1159F MED LIST DOCD IN RCRD: CPT | Mod: CPTII,,, | Performed by: STUDENT IN AN ORGANIZED HEALTH CARE EDUCATION/TRAINING PROGRAM

## 2023-05-09 PROCEDURE — 90677 PCV20 VACCINE IM: CPT | Mod: PBBFAC

## 2023-05-09 PROCEDURE — 99215 OFFICE O/P EST HI 40 MIN: CPT | Mod: PBBFAC | Performed by: STUDENT IN AN ORGANIZED HEALTH CARE EDUCATION/TRAINING PROGRAM

## 2023-05-09 PROCEDURE — 3044F HG A1C LEVEL LT 7.0%: CPT | Mod: CPTII,,, | Performed by: STUDENT IN AN ORGANIZED HEALTH CARE EDUCATION/TRAINING PROGRAM

## 2023-05-09 PROCEDURE — 3008F BODY MASS INDEX DOCD: CPT | Mod: CPTII,,, | Performed by: STUDENT IN AN ORGANIZED HEALTH CARE EDUCATION/TRAINING PROGRAM

## 2023-05-09 PROCEDURE — 99999 PR PBB SHADOW E&M-EST. PATIENT-LVL V: CPT | Mod: PBBFAC,,, | Performed by: STUDENT IN AN ORGANIZED HEALTH CARE EDUCATION/TRAINING PROGRAM

## 2023-05-09 PROCEDURE — 3044F PR MOST RECENT HEMOGLOBIN A1C LEVEL <7.0%: ICD-10-PCS | Mod: CPTII,,, | Performed by: STUDENT IN AN ORGANIZED HEALTH CARE EDUCATION/TRAINING PROGRAM

## 2023-05-09 PROCEDURE — 1159F PR MEDICATION LIST DOCUMENTED IN MEDICAL RECORD: ICD-10-PCS | Mod: CPTII,,, | Performed by: STUDENT IN AN ORGANIZED HEALTH CARE EDUCATION/TRAINING PROGRAM

## 2023-05-09 PROCEDURE — 99999 PR PBB SHADOW E&M-EST. PATIENT-LVL V: ICD-10-PCS | Mod: PBBFAC,,, | Performed by: STUDENT IN AN ORGANIZED HEALTH CARE EDUCATION/TRAINING PROGRAM

## 2023-05-09 PROCEDURE — 3008F PR BODY MASS INDEX (BMI) DOCUMENTED: ICD-10-PCS | Mod: CPTII,,, | Performed by: STUDENT IN AN ORGANIZED HEALTH CARE EDUCATION/TRAINING PROGRAM

## 2023-05-09 PROCEDURE — 3074F PR MOST RECENT SYSTOLIC BLOOD PRESSURE < 130 MM HG: ICD-10-PCS | Mod: CPTII,,, | Performed by: STUDENT IN AN ORGANIZED HEALTH CARE EDUCATION/TRAINING PROGRAM

## 2023-05-09 PROCEDURE — 3078F DIAST BP <80 MM HG: CPT | Mod: CPTII,,, | Performed by: STUDENT IN AN ORGANIZED HEALTH CARE EDUCATION/TRAINING PROGRAM

## 2023-05-09 PROCEDURE — 1160F RVW MEDS BY RX/DR IN RCRD: CPT | Mod: CPTII,,, | Performed by: STUDENT IN AN ORGANIZED HEALTH CARE EDUCATION/TRAINING PROGRAM

## 2023-05-09 PROCEDURE — 1160F PR REVIEW ALL MEDS BY PRESCRIBER/CLIN PHARMACIST DOCUMENTED: ICD-10-PCS | Mod: CPTII,,, | Performed by: STUDENT IN AN ORGANIZED HEALTH CARE EDUCATION/TRAINING PROGRAM

## 2023-05-09 PROCEDURE — 3078F PR MOST RECENT DIASTOLIC BLOOD PRESSURE < 80 MM HG: ICD-10-PCS | Mod: CPTII,,, | Performed by: STUDENT IN AN ORGANIZED HEALTH CARE EDUCATION/TRAINING PROGRAM

## 2023-05-09 PROCEDURE — 99214 PR OFFICE/OUTPT VISIT, EST, LEVL IV, 30-39 MIN: ICD-10-PCS | Mod: S$PBB,,, | Performed by: STUDENT IN AN ORGANIZED HEALTH CARE EDUCATION/TRAINING PROGRAM

## 2023-05-09 PROCEDURE — 3074F SYST BP LT 130 MM HG: CPT | Mod: CPTII,,, | Performed by: STUDENT IN AN ORGANIZED HEALTH CARE EDUCATION/TRAINING PROGRAM

## 2023-05-09 RX ORDER — CHOLECALCIFEROL (VITAMIN D3) 25 MCG
1000 TABLET ORAL DAILY
Qty: 30 TABLET | Refills: 11 | Status: SHIPPED | OUTPATIENT
Start: 2023-05-09 | End: 2023-06-13 | Stop reason: SDUPTHER

## 2023-05-09 RX ORDER — ERGOCALCIFEROL 1.25 MG/1
50000 CAPSULE ORAL
Qty: 4 CAPSULE | Refills: 5 | Status: CANCELLED | OUTPATIENT
Start: 2023-05-09

## 2023-05-09 NOTE — PROGRESS NOTES
Subjective:       Patient ID: Ethel Glass is a 37 y.o. female.    Chief Complaint: 3 month check up    Pt here for follow-up    YURI: recent iron level and anemia improved but remains low. She has received IV iron in the past. She also had mild thombocytosis on labs. She would still like to see Dr. Wade at [facility] who she has seen in the past.    Constipation: she has been doing well. She uses linzess.    DM2: she is currently on glyxambi 25/5mg daily and metformin 500mg BID. Last A1c 5.9    Vit D def: she is not currently taking a vit D supplement.    GERD: she was on protonix and has been for years. Was followed by GI in [place]. Recently medicaid has stopped paying for her protonix and she would like to establish with GI locally.     HTN: doing well with spironolactone.    She is still followed by mental health.    Review of Systems   Constitutional:  Negative for chills and fever.   HENT:  Negative for congestion and sore throat.    Respiratory:  Negative for cough and shortness of breath.    Cardiovascular:  Negative for chest pain.   Gastrointestinal:  Negative for abdominal pain, constipation, diarrhea and vomiting.   Genitourinary:  Negative for dysuria and hematuria.   Musculoskeletal:  Positive for arthralgias.   Neurological:  Negative for dizziness, syncope and light-headedness.     Objective:      Physical Exam   Constitutional: She is oriented to person, place, and time. No distress.   HENT:   Head: Normocephalic and atraumatic.   Eyes: Conjunctivae are normal.   Cardiovascular: Normal rate, regular rhythm and normal heart sounds.   No murmur heard.  Pulmonary/Chest: Effort normal and breath sounds normal. No respiratory distress.   Neurological: She is alert and oriented to person, place, and time.        Psychiatric: Her behavior is normal. Mood normal.   Vitals reviewed.    Assessment:       1. Mixed hyperlipidemia    2. Essential hypertension    3. Iron deficiency anemia, unspecified  iron deficiency anemia type    4. Type 2 diabetes mellitus without complication, without long-term current use of insulin    5. Gastroesophageal reflux disease, unspecified whether esophagitis present    6. Vitamin D deficiency    7. Thrombocytosis    8. Need for pneumococcal vaccination            Plan:           1. Mixed hyperlipidemia  -     CBC Auto Differential; Future; Expected date: 11/07/2023  -     Comprehensive Metabolic Panel; Future; Expected date: 11/07/2023  -     Lipid Panel; Future; Expected date: 11/07/2023    2. Essential hypertension  -     CBC Auto Differential; Future; Expected date: 11/07/2023  -     Comprehensive Metabolic Panel; Future; Expected date: 11/07/2023  -     Lipid Panel; Future; Expected date: 11/07/2023    3. Iron deficiency anemia, unspecified iron deficiency anemia type  -     Ambulatory referral/consult to Hematology / Oncology; Future; Expected date: 05/16/2023    4. Type 2 diabetes mellitus without complication, without long-term current use of insulin  -     Ambulatory referral/consult to Ophthalmology; Future; Expected date: 05/16/2023  -     CBC Auto Differential; Future; Expected date: 11/07/2023  -     Comprehensive Metabolic Panel; Future; Expected date: 11/07/2023  -     Hemoglobin A1C; Future; Expected date: 11/07/2023  -     Lipid Panel; Future; Expected date: 11/07/2023    5. Gastroesophageal reflux disease, unspecified whether esophagitis present    6. Vitamin D deficiency  -     vitamin D (VITAMIN D3) 1000 units Tab; Take 1 tablet (1,000 Units total) by mouth once daily.  Dispense: 30 tablet; Refill: 11  -     Vitamin D; Future; Expected date: 11/07/2023    7. Thrombocytosis  -     Ambulatory referral/consult to Hematology / Oncology; Future; Expected date: 05/16/2023    8. Need for pneumococcal vaccination  -     (In Office Administered) Pneumococcal Conjugate Vaccine (20 Valent) (IM)      Cont current meds  Start vit D 1000u daily  Follow-up psych as  scheduled  Another referral placed to Dr. Wade, hem/onc per pt request  PCV20 today    RTC 6 months with labs prior

## 2023-05-24 RX ORDER — ALBUTEROL SULFATE 0.83 MG/ML
SOLUTION RESPIRATORY (INHALATION)
Qty: 225 ML | Refills: 3 | Status: SHIPPED | OUTPATIENT
Start: 2023-05-24 | End: 2023-07-25 | Stop reason: SDUPTHER

## 2023-05-24 NOTE — TELEPHONE ENCOUNTER
No care due was identified.  [facility] Embedded Care Due Messages. Reference number: [id number].   5/24/2023 4:22:30 PM CDT

## 2023-06-02 ENCOUNTER — PATIENT OUTREACH (OUTPATIENT)
Dept: ADMINISTRATIVE | Facility: HOSPITAL | Age: 38
End: 2023-06-02
Payer: MEDICAID

## 2023-06-08 ENCOUNTER — PATIENT OUTREACH (OUTPATIENT)
Dept: ADMINISTRATIVE | Facility: HOSPITAL | Age: 38
End: 2023-06-08
Payer: MEDICAID

## 2023-06-08 NOTE — PROGRESS NOTES
Chart reviewed, immunization record updated.  No new results noted on [facility] or [facility] web site.  Care Everywhere updated.   Patient care coordination note  Upcoming PCP visit updated.  Next PCP visit 11/15/2023.  Attempted to contact patient to discuss rescheduling [facility], unable to contact.

## 2023-06-13 DIAGNOSIS — E55.9 VITAMIN D DEFICIENCY: ICD-10-CM

## 2023-06-14 RX ORDER — CHOLECALCIFEROL (VITAMIN D3) 25 MCG
1000 TABLET ORAL DAILY
Qty: 30 TABLET | Refills: 11 | Status: SHIPPED | OUTPATIENT
Start: 2023-06-14 | End: 2024-03-02

## 2023-06-14 NOTE — TELEPHONE ENCOUNTER
Patient requesting refill of vitamin D (VITAMIN D3) 1000 units Tab. LOV was 5/9/2023, medication pended.    Please advise

## 2023-07-07 ENCOUNTER — PATIENT OUTREACH (OUTPATIENT)
Dept: ADMINISTRATIVE | Facility: HOSPITAL | Age: 38
End: 2023-07-07
Payer: MEDICAID

## 2023-07-10 DIAGNOSIS — E11.9 TYPE 2 DIABETES MELLITUS WITHOUT COMPLICATION, WITHOUT LONG-TERM CURRENT USE OF INSULIN: ICD-10-CM

## 2023-07-10 RX ORDER — EMPAGLIFLOZIN AND LINAGLIPTIN 25; 5 MG/1; MG/1
TABLET, FILM COATED ORAL
Qty: 30 TABLET | Refills: 3 | Status: SHIPPED | OUTPATIENT
Start: 2023-07-10 | End: 2024-02-29

## 2023-07-10 NOTE — TELEPHONE ENCOUNTER
Refill Decision Note   Ethel Glass  is requesting a refill authorization.  Brief Assessment and Rationale for Refill:  Approve     Medication Therapy Plan:       Medication Reconciliation Completed: No   Comments:     No Care Gaps recommended.     Note composed:9:41 AM 07/10/2023

## 2023-07-10 NOTE — TELEPHONE ENCOUNTER
No care due was identified.  [facility] Embedded Care Due Messages. Reference number: [id number].   7/10/2023 8:02:57 AM CDT

## 2023-07-13 ENCOUNTER — PATIENT MESSAGE (OUTPATIENT)
Dept: ADMINISTRATIVE | Facility: HOSPITAL | Age: 38
End: 2023-07-13
Payer: MEDICAID

## 2023-07-25 ENCOUNTER — PATIENT MESSAGE (OUTPATIENT)
Dept: ADMINISTRATIVE | Facility: HOSPITAL | Age: 38
End: 2023-07-25
Payer: MEDICAID

## 2023-07-26 ENCOUNTER — PATIENT OUTREACH (OUTPATIENT)
Dept: ADMINISTRATIVE | Facility: HOSPITAL | Age: 38
End: 2023-07-26
Payer: MEDICAID

## 2023-07-26 NOTE — PROGRESS NOTES
Chart reviewed, immunization record updated.  No new results noted on [facility] or [facility] web site.  Care Everywhere updated.   Patient care coordination note updated.   LOV with PCP 05/09/2023.   Attempted to contact patient to discuss Diabetic Eye Exam scheduling.   No answer, left voicemail for patient to return call to clinic.

## 2023-07-27 ENCOUNTER — PATIENT MESSAGE (OUTPATIENT)
Dept: ADMINISTRATIVE | Facility: HOSPITAL | Age: 38
End: 2023-07-27
Payer: MEDICAID

## 2023-07-31 ENCOUNTER — TELEPHONE (OUTPATIENT)
Dept: ADMINISTRATIVE | Facility: HOSPITAL | Age: 38
End: 2023-07-31
Payer: MEDICAID

## 2023-07-31 NOTE — TELEPHONE ENCOUNTER
Spoke to MsTania Quan explained that her referral was in and the ophthalmology dept will be calling to schedule her eye exam The referral was placed

## 2023-08-09 DIAGNOSIS — J30.2 SEASONAL ALLERGIES: ICD-10-CM

## 2023-08-09 RX ORDER — CETIRIZINE HYDROCHLORIDE 10 MG/1
TABLET ORAL
Qty: 90 TABLET | Refills: 2 | Status: SHIPPED | OUTPATIENT
Start: 2023-08-09

## 2023-08-09 NOTE — TELEPHONE ENCOUNTER
Care Due:                  Date            Visit Type   Department     Provider  --------------------------------------------------------------------------------                                EP -                              [facility]  Last Visit: 05-      CARE ([facility])   SHA Orta                              EP -                              [facility]  Next Visit: 11-      CARE ([facility])   MEDICINE       Raad Orta                                                            Last  Test          Frequency    Reason                     Performed    Due Date  --------------------------------------------------------------------------------    HBA1C.......  6 months...  GLYXAMBI, metFORMIN......  05-   10-    Lipid Panel.  12 months..  rosuvastatin.............  11-   10-    [facility] Embedded Care Due Messages. Reference number: [id number].   8/09/2023 8:06:03 AM CDT

## 2023-08-10 NOTE — TELEPHONE ENCOUNTER
Provider Staff:  Action required for this patient     Please see care gap opportunities below in Care Due Message.    Thanks!  [facility]     Appointments      Date Provider   Last Visit   5/9/2023 Raad Orta MD   Next Visit   11/15/2023 Raad Orta MD     Refill Decision Note   Ethel Glass  is requesting a refill authorization.  Brief Assessment and Rationale for Refill:  Approve     Medication Therapy Plan:         Comments:     Note composed:7:37 PM 08/09/2023

## 2023-08-17 DIAGNOSIS — E11.9 TYPE 2 DIABETES MELLITUS WITHOUT COMPLICATION, WITHOUT LONG-TERM CURRENT USE OF INSULIN: ICD-10-CM

## 2023-08-17 DIAGNOSIS — E78.2 MIXED HYPERLIPIDEMIA: ICD-10-CM

## 2023-08-17 RX ORDER — METFORMIN HYDROCHLORIDE 500 MG/1
500 TABLET ORAL 2 TIMES DAILY
Qty: 180 TABLET | Refills: 1 | Status: SHIPPED | OUTPATIENT
Start: 2023-08-17 | End: 2024-02-07

## 2023-08-17 RX ORDER — ROSUVASTATIN CALCIUM 40 MG/1
40 TABLET, COATED ORAL NIGHTLY
Qty: 90 TABLET | Refills: 0 | Status: SHIPPED | OUTPATIENT
Start: 2023-08-17 | End: 2023-11-05

## 2023-08-17 NOTE — TELEPHONE ENCOUNTER
Refill Decision Note   Kateysegundo Quan  is requesting a refill authorization.  Brief Assessment and Rationale for Refill:  Approve     Medication Therapy Plan:         Comments:     Note composed:1:22 PM 08/17/2023

## 2023-08-17 NOTE — TELEPHONE ENCOUNTER
No care due was identified.  [facility] Embedded Care Due Messages. Reference number: [id number].   8/17/2023 11:14:52 AM CDT

## 2023-08-25 ENCOUNTER — TELEPHONE (OUTPATIENT)
Dept: FAMILY MEDICINE | Facility: CLINIC | Age: 38
End: 2023-08-25
Payer: MEDICAID

## 2023-08-25 NOTE — TELEPHONE ENCOUNTER
----- Message from Fawad Newman sent at 2023  8:21 AM CDT -----  Contact: Patient  Ethel Glass  MRN: [id number]  : 1985  PCP: Raad Orta  Home Phone      [phone]  Work Phone      Not on file.  Mobile          [phone]  Mobile          [phone]  Home Phone      [phone]  Home Phone      Not on file.      MESSAGE: Requesting Rx for 8 Covid test - insurance will pay with an Rx from the -- uses [facility]    Call [phone]    PCP: You

## 2023-08-25 NOTE — TELEPHONE ENCOUNTER
MESSAGE: Requesting Rx for 8 Covid test - insurance will pay with an Rx from the Dr-- uses [facility]     Call [phone]      Please advise

## 2023-08-29 RX ORDER — COVID-19 ANTIGEN TEST
1 KIT MISCELLANEOUS
Qty: 8 EACH | Refills: 5 | Status: SHIPPED | OUTPATIENT
Start: 2023-08-29

## 2023-09-05 DIAGNOSIS — E11.65 TYPE 2 DIABETES MELLITUS WITH HYPERGLYCEMIA, WITHOUT LONG-TERM CURRENT USE OF INSULIN: ICD-10-CM

## 2023-09-05 RX ORDER — INSULIN PUMP SYRINGE, 3 ML
EACH MISCELLANEOUS
Qty: 1 EACH | Refills: 0 | Status: SHIPPED | OUTPATIENT
Start: 2023-09-05

## 2023-09-05 RX ORDER — BLOOD SUGAR DIAGNOSTIC
STRIP MISCELLANEOUS
Qty: 100 STRIP | Refills: 11 | Status: SHIPPED | OUTPATIENT
Start: 2023-09-05

## 2023-09-05 NOTE — TELEPHONE ENCOUNTER
LOV 05/09/2023    patient requesting refill for   lancets Misc 100 each     flash glucose sensor (FREESTYLE SANDRA 14 DAY SENSOR) Kit 1 kit         RX pending   pharmacy confirmed  please advise

## 2023-09-05 NOTE — TELEPHONE ENCOUNTER
No care due was identified.  [facility] Embedded Care Due Messages. Reference number: [id number].   9/05/2023 8:44:04 AM CDT

## 2023-09-06 ENCOUNTER — PATIENT MESSAGE (OUTPATIENT)
Dept: OTHER | Facility: OTHER | Age: 38
End: 2023-09-06
Payer: MEDICAID

## 2023-09-14 ENCOUNTER — PATIENT OUTREACH (OUTPATIENT)
Dept: FAMILY MEDICINE | Facility: CLINIC | Age: 38
End: 2023-09-14
Payer: MEDICAID

## 2023-10-10 ENCOUNTER — OFFICE VISIT (OUTPATIENT)
Dept: FAMILY MEDICINE | Facility: CLINIC | Age: 38
End: 2023-10-10
Payer: MEDICAID

## 2023-10-10 VITALS
DIASTOLIC BLOOD PRESSURE: 68 MMHG | HEART RATE: 88 BPM | HEIGHT: 62 IN | RESPIRATION RATE: 12 BRPM | SYSTOLIC BLOOD PRESSURE: 104 MMHG | WEIGHT: 209.44 LBS | BODY MASS INDEX: 38.54 KG/M2

## 2023-10-10 DIAGNOSIS — E11.9 TYPE 2 DIABETES MELLITUS WITHOUT COMPLICATION, WITHOUT LONG-TERM CURRENT USE OF INSULIN: ICD-10-CM

## 2023-10-10 DIAGNOSIS — K59.09 OTHER CONSTIPATION: Primary | ICD-10-CM

## 2023-10-10 DIAGNOSIS — N76.0 BV (BACTERIAL VAGINOSIS): ICD-10-CM

## 2023-10-10 DIAGNOSIS — B37.31 VAGINAL YEAST INFECTION: ICD-10-CM

## 2023-10-10 DIAGNOSIS — K21.9 GASTROESOPHAGEAL REFLUX DISEASE, UNSPECIFIED WHETHER ESOPHAGITIS PRESENT: ICD-10-CM

## 2023-10-10 DIAGNOSIS — B96.89 BV (BACTERIAL VAGINOSIS): ICD-10-CM

## 2023-10-10 PROCEDURE — 99214 PR OFFICE/OUTPT VISIT, EST, LEVL IV, 30-39 MIN: ICD-10-PCS | Mod: S$PBB,,, | Performed by: STUDENT IN AN ORGANIZED HEALTH CARE EDUCATION/TRAINING PROGRAM

## 2023-10-10 PROCEDURE — 99214 OFFICE O/P EST MOD 30 MIN: CPT | Mod: PBBFAC | Performed by: STUDENT IN AN ORGANIZED HEALTH CARE EDUCATION/TRAINING PROGRAM

## 2023-10-10 PROCEDURE — 1159F MED LIST DOCD IN RCRD: CPT | Mod: CPTII,,, | Performed by: STUDENT IN AN ORGANIZED HEALTH CARE EDUCATION/TRAINING PROGRAM

## 2023-10-10 PROCEDURE — 3078F PR MOST RECENT DIASTOLIC BLOOD PRESSURE < 80 MM HG: ICD-10-PCS | Mod: CPTII,,, | Performed by: STUDENT IN AN ORGANIZED HEALTH CARE EDUCATION/TRAINING PROGRAM

## 2023-10-10 PROCEDURE — 81001 URINALYSIS AUTO W/SCOPE: CPT | Mod: PBBFAC | Performed by: STUDENT IN AN ORGANIZED HEALTH CARE EDUCATION/TRAINING PROGRAM

## 2023-10-10 PROCEDURE — 3008F PR BODY MASS INDEX (BMI) DOCUMENTED: ICD-10-PCS | Mod: CPTII,,, | Performed by: STUDENT IN AN ORGANIZED HEALTH CARE EDUCATION/TRAINING PROGRAM

## 2023-10-10 PROCEDURE — 3008F BODY MASS INDEX DOCD: CPT | Mod: CPTII,,, | Performed by: STUDENT IN AN ORGANIZED HEALTH CARE EDUCATION/TRAINING PROGRAM

## 2023-10-10 PROCEDURE — 3074F PR MOST RECENT SYSTOLIC BLOOD PRESSURE < 130 MM HG: ICD-10-PCS | Mod: CPTII,,, | Performed by: STUDENT IN AN ORGANIZED HEALTH CARE EDUCATION/TRAINING PROGRAM

## 2023-10-10 PROCEDURE — 99999PBSHW POCT URINE SEDIMENT EXAM: Mod: PBBFAC,,,

## 2023-10-10 PROCEDURE — 3044F PR MOST RECENT HEMOGLOBIN A1C LEVEL <7.0%: ICD-10-PCS | Mod: CPTII,,, | Performed by: STUDENT IN AN ORGANIZED HEALTH CARE EDUCATION/TRAINING PROGRAM

## 2023-10-10 PROCEDURE — 99999 PR PBB SHADOW E&M-EST. PATIENT-LVL IV: CPT | Mod: PBBFAC,,, | Performed by: STUDENT IN AN ORGANIZED HEALTH CARE EDUCATION/TRAINING PROGRAM

## 2023-10-10 PROCEDURE — 99999PBSHW POCT URINALYSIS, DIPSTICK OR TABLET REAGENT, AUTOMATED, WITH MICROSCOP: Mod: PBBFAC,,,

## 2023-10-10 PROCEDURE — 3074F SYST BP LT 130 MM HG: CPT | Mod: CPTII,,, | Performed by: STUDENT IN AN ORGANIZED HEALTH CARE EDUCATION/TRAINING PROGRAM

## 2023-10-10 PROCEDURE — 3078F DIAST BP <80 MM HG: CPT | Mod: CPTII,,, | Performed by: STUDENT IN AN ORGANIZED HEALTH CARE EDUCATION/TRAINING PROGRAM

## 2023-10-10 PROCEDURE — 81000 URINALYSIS NONAUTO W/SCOPE: CPT | Mod: PBBFAC | Performed by: STUDENT IN AN ORGANIZED HEALTH CARE EDUCATION/TRAINING PROGRAM

## 2023-10-10 PROCEDURE — 99999PBSHW POCT URINE SEDIMENT EXAM: ICD-10-PCS | Mod: PBBFAC,,,

## 2023-10-10 PROCEDURE — 99214 OFFICE O/P EST MOD 30 MIN: CPT | Mod: S$PBB,,, | Performed by: STUDENT IN AN ORGANIZED HEALTH CARE EDUCATION/TRAINING PROGRAM

## 2023-10-10 PROCEDURE — 1159F PR MEDICATION LIST DOCUMENTED IN MEDICAL RECORD: ICD-10-PCS | Mod: CPTII,,, | Performed by: STUDENT IN AN ORGANIZED HEALTH CARE EDUCATION/TRAINING PROGRAM

## 2023-10-10 PROCEDURE — 3044F HG A1C LEVEL LT 7.0%: CPT | Mod: CPTII,,, | Performed by: STUDENT IN AN ORGANIZED HEALTH CARE EDUCATION/TRAINING PROGRAM

## 2023-10-10 PROCEDURE — 99999 PR PBB SHADOW E&M-EST. PATIENT-LVL IV: ICD-10-PCS | Mod: PBBFAC,,, | Performed by: STUDENT IN AN ORGANIZED HEALTH CARE EDUCATION/TRAINING PROGRAM

## 2023-10-10 RX ORDER — ESCITALOPRAM OXALATE 10 MG/1
10 TABLET ORAL
COMMUNITY
Start: 2023-09-18

## 2023-10-10 RX ORDER — FLUCONAZOLE 150 MG/1
150 TABLET ORAL DAILY
Qty: 2 TABLET | Refills: 0 | Status: SHIPPED | OUTPATIENT
Start: 2023-10-10 | End: 2023-10-11

## 2023-10-10 RX ORDER — METRONIDAZOLE 500 MG/1
500 TABLET ORAL EVERY 12 HOURS
Qty: 14 TABLET | Refills: 0 | Status: SHIPPED | OUTPATIENT
Start: 2023-10-10 | End: 2023-10-17

## 2023-10-10 RX ORDER — PANTOPRAZOLE SODIUM 40 MG/1
40 TABLET, DELAYED RELEASE ORAL DAILY
Qty: 90 TABLET | Refills: 3 | Status: SHIPPED | OUTPATIENT
Start: 2023-10-10 | End: 2023-10-17 | Stop reason: SDUPTHER

## 2023-10-10 NOTE — PROGRESS NOTES
Subjective:       Patient ID: Ethel Glass is a 38 y.o. female.    Chief Complaint: Urinary Tract Infection (Pt has been experiencing elevated sugars and dark/ cloudy urine) and Hip Pain (Has been experiencing right hip pain. )    Pt here for follow-up    YURI: recent iron level and anemia improved but remains low. She has received IV iron in the past. She also had mild thombocytosis on labs. She is followed by Dr. Wade at [facility], IV iron has been ordered.    Constipation: she has been doing well. She uses linzess. Requesting refill.    DM2: she is currently on glyxambi 25/5mg daily and metformin 500mg BID. Last A1c 5.9. she reports one high glucose in the 300s a few weeks ago.    Vit D def: she is not currently taking a vit D supplement.    HLD: tolerating statin.    GERD: she was on protonix and has been for years. Was followed by GI in [place] in the past, referred locally at last visit. Requesting refill.    HTN: doing well with spironolactone.    She is still followed by mental health.    She has been having uti symptoms. Reports dark urine with strong odor. No vaginal discharge. She has a history of BV that felt the same.    She is worried about her weight. She gained about 20lb since her last visit. She reports she has switched to zero calorie soda. She has trouble exercising due to pain.    Review of Systems   Constitutional:  Negative for chills and fever.   HENT:  Negative for congestion and sore throat.    Respiratory:  Negative for cough and shortness of breath.    Cardiovascular:  Negative for chest pain.   Gastrointestinal:  Negative for abdominal pain, constipation, diarrhea and vomiting.   Genitourinary:  Negative for dysuria and hematuria.   Musculoskeletal:  Positive for arthralgias.   Neurological:  Negative for dizziness, syncope and light-headedness.       Objective:      Physical Exam  Vitals reviewed.   Constitutional:       General: She is not in acute distress.  HENT:      Head:  Normocephalic and atraumatic.   Eyes:      Conjunctiva/sclera: Conjunctivae normal.   Cardiovascular:      Rate and Rhythm: Normal rate and regular rhythm.      Heart sounds: Normal heart sounds. No murmur heard.  Pulmonary:      Effort: Pulmonary effort is normal. No respiratory distress.      Breath sounds: Normal breath sounds.   Neurological:      Mental Status: She is alert and oriented to person, place, and time.      Comments:      Psychiatric:         Mood and Affect: Mood normal.         Behavior: Behavior normal.         Assessment:       1. Other constipation    2. Gastroesophageal reflux disease, unspecified whether esophagitis present    3. Type 2 diabetes mellitus without complication, without long-term current use of insulin    4. BV (bacterial vaginosis)    5. Vaginal yeast infection            Plan:           1. Other constipation  -     linaCLOtide (LINZESS) 145 mcg Cap capsule; Take 1 capsule (145 mcg total) by mouth before breakfast.  Dispense: 30 capsule; Refill: 5    2. Gastroesophageal reflux disease, unspecified whether esophagitis present  -     pantoprazole (PROTONIX) 40 MG tablet; Take 1 tablet (40 mg total) by mouth once daily.  Dispense: 90 tablet; Refill: 3    3. Type 2 diabetes mellitus without complication, without long-term current use of insulin  -     Microalbumin/Creatinine Ratio, Urine; Future; Expected date: 10/10/2023    4. BV (bacterial vaginosis)  -     metroNIDAZOLE (FLAGYL) 500 MG tablet; Take 1 tablet (500 mg total) by mouth every 12 (twelve) hours. for 7 days  Dispense: 14 tablet; Refill: 0    5. Vaginal yeast infection  -     fluconazole (DIFLUCAN) 150 MG Tab; Take 1 tablet (150 mg total) by mouth once daily. Can repeat dose x1 if symptoms persist after 72hrs for 1 day  Dispense: 2 tablet; Refill: 0      Cont current meds  Follow-up psych as scheduled  Follow-up Dr. Wade, hem/onc, as scheduled  Abx as ordered  Fluconazole for vag yeast  RTC 3 months or sooner if  needed

## 2023-10-11 LAB
BACTERIA SPEC CULT: NORMAL /HPF
BILIRUB SERPL-MCNC: NORMAL MG/DL
BLOOD URINE, POC: NORMAL
CASTS: NORMAL
COLOR, POC UA: YELLOW
CRYSTALS: NORMAL
GLUCOSE UR QL STRIP: NORMAL
KETONES UR QL STRIP: NORMAL
LEUKOCYTE ESTERASE URINE, POC: NORMAL
NITRITE, POC UA: NORMAL
PH, POC UA: 6.5
PROTEIN, POC: NORMAL
RBC CELLS COUNTED: NORMAL
SPECIFIC GRAVITY, POC UA: 1.01
UROBILINOGEN, POC UA: 0.2
WHITE BLOOD CELLS: NORMAL

## 2023-10-17 DIAGNOSIS — M54.50 CHRONIC MIDLINE LOW BACK PAIN, UNSPECIFIED WHETHER SCIATICA PRESENT: ICD-10-CM

## 2023-10-17 DIAGNOSIS — G89.29 CHRONIC MIDLINE LOW BACK PAIN, UNSPECIFIED WHETHER SCIATICA PRESENT: ICD-10-CM

## 2023-10-17 DIAGNOSIS — K21.9 GASTROESOPHAGEAL REFLUX DISEASE, UNSPECIFIED WHETHER ESOPHAGITIS PRESENT: ICD-10-CM

## 2023-10-17 DIAGNOSIS — E87.6 HYPOKALEMIA: ICD-10-CM

## 2023-10-17 RX ORDER — GABAPENTIN 300 MG/1
300 CAPSULE ORAL 2 TIMES DAILY
Qty: 60 CAPSULE | Refills: 5 | Status: SHIPPED | OUTPATIENT
Start: 2023-10-17 | End: 2024-04-14

## 2023-10-17 RX ORDER — CYCLOBENZAPRINE HCL 10 MG
10 TABLET ORAL NIGHTLY
Qty: 15 TABLET | Refills: 2 | Status: SHIPPED | OUTPATIENT
Start: 2023-10-17 | End: 2023-10-17 | Stop reason: SDUPTHER

## 2023-10-17 RX ORDER — PANTOPRAZOLE SODIUM 40 MG/1
40 TABLET, DELAYED RELEASE ORAL DAILY
Qty: 90 TABLET | Refills: 3 | Status: SHIPPED | OUTPATIENT
Start: 2023-10-17

## 2023-10-17 RX ORDER — CYCLOBENZAPRINE HCL 10 MG
10 TABLET ORAL NIGHTLY
Qty: 15 TABLET | Refills: 2 | Status: SHIPPED | OUTPATIENT
Start: 2023-10-17 | End: 2023-12-19

## 2023-10-17 RX ORDER — POTASSIUM CHLORIDE 20 MEQ/1
20 TABLET, EXTENDED RELEASE ORAL 2 TIMES DAILY
Qty: 30 TABLET | Refills: 5 | Status: SHIPPED | OUTPATIENT
Start: 2023-10-17 | End: 2024-02-04

## 2023-10-17 NOTE — TELEPHONE ENCOUNTER
No care due was identified.  [facility] Embedded Care Due Messages. Reference number: [id number].   10/17/2023 8:52:07 AM CDT

## 2023-10-17 NOTE — TELEPHONE ENCOUNTER
No care due was identified.  Health [facility] Embedded Care Due Messages. Reference number: [id number].   10/17/2023 4:23:33 PM CDT

## 2023-10-19 ENCOUNTER — TELEPHONE (OUTPATIENT)
Dept: FAMILY MEDICINE | Facility: CLINIC | Age: 38
End: 2023-10-19
Payer: MEDICAID

## 2023-10-19 NOTE — TELEPHONE ENCOUNTER
----- Message from Lo Guzman sent at 10/19/2023  3:09 PM CDT -----  Contact: Avisivan Ej/win Glass  MRN: [id number]  : 1985  PCP: Raad Orta  Home Phone      [phone]  Work Phone      Not on file.  Mobile          [phone]  Mobile          [phone]  Home Phone      [phone]  Home Phone      Not on file.      MESSAGE:   Patient needs PA on medication pantoprazole (PROTONIX) 40 MG tablet.

## 2023-11-05 DIAGNOSIS — E78.2 MIXED HYPERLIPIDEMIA: ICD-10-CM

## 2023-11-05 RX ORDER — ROSUVASTATIN CALCIUM 40 MG/1
40 TABLET, COATED ORAL NIGHTLY
Qty: 90 TABLET | Refills: 3 | Status: SHIPPED | OUTPATIENT
Start: 2023-11-05

## 2023-11-05 NOTE — TELEPHONE ENCOUNTER
Refill Decision Note   Ethel Glass  is requesting a refill authorization.  Brief Assessment and Rationale for Refill:  Approve     Medication Therapy Plan:         Comments:     Note composed:9:03 AM 11/05/2023             Appointments     Last Visit   10/10/2023 Raad Orta MD   Next Visit   1/5/2024 Raad Orta MD

## 2023-11-05 NOTE — TELEPHONE ENCOUNTER
No care due was identified.  [facility] Embedded Care Due Messages. Reference number: [id number].   11/05/2023 8:04:19 AM CST

## 2023-11-08 ENCOUNTER — TELEPHONE (OUTPATIENT)
Dept: FAMILY MEDICINE | Facility: CLINIC | Age: 38
End: 2023-11-08
Payer: MEDICAID

## 2023-11-08 NOTE — TELEPHONE ENCOUNTER
----- Message from Fawad Newman sent at 2023 11:06 AM CST -----  Contact: Patient  Ethel Glass  MRN: [id number]  : 1985  PCP: Raad Orta  Home Phone      [phone]  Work Phone      Not on file.  Mobile          [phone]  Mobile          [phone]  Home Phone      [phone]  Home Phone      Not on file.      MESSAGE: requesting help with referral to eye dr for glasses     Call [phone]    PCP: You

## 2023-11-27 ENCOUNTER — TELEPHONE (OUTPATIENT)
Dept: FAMILY MEDICINE | Facility: CLINIC | Age: 38
End: 2023-11-27
Payer: MEDICAID

## 2023-11-27 DIAGNOSIS — J40 BRONCHITIS: Primary | ICD-10-CM

## 2023-11-27 NOTE — TELEPHONE ENCOUNTER
PT states she has restrictive lung disease and was dx with pneumonia last week and was requesting to know if she should follow up with .    Referral pended

## 2023-11-27 NOTE — TELEPHONE ENCOUNTER
----- Message from Lo Guzman sent at 2023 11:18 AM CST -----  Contact: self  Ethel Glass  MRN: [id number]  : 1985  PCP: Raad Orta  Home Phone      [phone]  Work Phone      Not on file.  Mobile          [phone]  Mobile          [phone]  Home Phone      [phone]  Home Phone      Not on file.      MESSAGE:   Patient wants a call back she says she has restricted igor disease, and she had pnemonia this week. She is wanting to know if she needs to go see dr cantor again, if so she states she would need a referral    [phone]

## 2023-12-16 DIAGNOSIS — G89.29 CHRONIC MIDLINE LOW BACK PAIN, UNSPECIFIED WHETHER SCIATICA PRESENT: ICD-10-CM

## 2023-12-16 DIAGNOSIS — M54.50 CHRONIC MIDLINE LOW BACK PAIN, UNSPECIFIED WHETHER SCIATICA PRESENT: ICD-10-CM

## 2023-12-16 NOTE — TELEPHONE ENCOUNTER
No care due was identified.  [facility] Embedded Care Due Messages. Reference number: [id number].   12/16/2023 8:00:36 AM CST

## 2023-12-18 NOTE — TELEPHONE ENCOUNTER
LOV 10/10/2023    patient requesting refill for    Disp Refills Start End MACARIO   cyclobenzaprine (FLEXERIL) 10 MG tablet 15 tablet               RX pending   pharmacy confirmed  please advise

## 2023-12-19 RX ORDER — CYCLOBENZAPRINE HCL 10 MG
10 TABLET ORAL NIGHTLY
Qty: 15 TABLET | Refills: 2 | Status: SHIPPED | OUTPATIENT
Start: 2023-12-19 | End: 2024-02-20

## 2024-01-02 DIAGNOSIS — I10 ESSENTIAL HYPERTENSION: ICD-10-CM

## 2024-01-02 RX ORDER — SPIRONOLACTONE 100 MG/1
100 TABLET, FILM COATED ORAL DAILY
Qty: 90 TABLET | Refills: 1 | Status: SHIPPED | OUTPATIENT
Start: 2024-01-02

## 2024-01-02 NOTE — TELEPHONE ENCOUNTER
No care due was identified.  [facility] Embedded Care Due Messages. Reference number: [id number].   1/02/2024 7:29:13 AM CST

## 2024-01-22 ENCOUNTER — TELEPHONE (OUTPATIENT)
Dept: FAMILY MEDICINE | Facility: CLINIC | Age: 39
End: 2024-01-22
Payer: MEDICAID

## 2024-01-22 NOTE — TELEPHONE ENCOUNTER
----- Message from Lo Guzman sent at 2024 10:49 AM CST -----  Contact: self  Ethel Glass  MRN: [id number]  : 1985  PCP: Raad Orta  Home Phone      [phone]  Work Phone      Not on file.  Mobile          [phone]  Mobile          [phone]  Home Phone      [phone]  Home Phone      Not on file.      MESSAGE:   Patient needs a sooner apt than the one that was given for her diabetes check up/wellness. Patient also says she is having a hard time getting in touch with referral        [phone]

## 2024-02-07 DIAGNOSIS — E11.9 TYPE 2 DIABETES MELLITUS WITHOUT COMPLICATION, WITHOUT LONG-TERM CURRENT USE OF INSULIN: ICD-10-CM

## 2024-02-07 RX ORDER — METFORMIN HYDROCHLORIDE 500 MG/1
500 TABLET ORAL 2 TIMES DAILY
Qty: 180 TABLET | Refills: 0 | Status: SHIPPED | OUTPATIENT
Start: 2024-02-07 | End: 2024-05-01

## 2024-02-07 NOTE — TELEPHONE ENCOUNTER
Refill Decision Note   Ethel Glass  is requesting a refill authorization.  Brief Assessment and Rationale for Refill:  Approve     Medication Therapy Plan:         Comments:     Note composed:9:07 AM 02/07/2024

## 2024-02-07 NOTE — TELEPHONE ENCOUNTER
No care due was identified.  [facility] Embedded Care Due Messages. Reference number: [id number].   2/07/2024 9:01:15 AM CST

## 2024-02-20 DIAGNOSIS — G89.29 CHRONIC MIDLINE LOW BACK PAIN, UNSPECIFIED WHETHER SCIATICA PRESENT: ICD-10-CM

## 2024-02-20 DIAGNOSIS — M54.50 CHRONIC MIDLINE LOW BACK PAIN, UNSPECIFIED WHETHER SCIATICA PRESENT: ICD-10-CM

## 2024-02-20 RX ORDER — CYCLOBENZAPRINE HCL 10 MG
10 TABLET ORAL NIGHTLY
Qty: 15 TABLET | Refills: 2 | Status: SHIPPED | OUTPATIENT
Start: 2024-02-20 | End: 2024-04-27 | Stop reason: SDUPTHER

## 2024-02-20 NOTE — TELEPHONE ENCOUNTER
No care due was identified.  [facility] Embedded Care Due Messages. Reference number: [id number].   2/20/2024 8:04:36 AM CST

## 2024-02-27 ENCOUNTER — TELEPHONE (OUTPATIENT)
Dept: FAMILY MEDICINE | Facility: CLINIC | Age: 39
End: 2024-02-27
Payer: MEDICAID

## 2024-02-27 NOTE — TELEPHONE ENCOUNTER
----- Message from Niles Butler sent at 2024 12:39 PM CST -----  Contact: pt  Ethel Glass  MRN: [id number]  : 1985  PCP: Raad Orta  Home Phone      [phone]  Work Phone      Not on file.  Mobile          [phone]  Mobile          [phone]  Home Phone      [phone]  Home Phone      Not on file.      MESSAGE:     Pt called wanting to speak with nurse about appt in Apr. Pt would like a sooner appt than her current appt, because of pts A1C.        Please advise  [phone]

## 2024-02-29 ENCOUNTER — OFFICE VISIT (OUTPATIENT)
Dept: FAMILY MEDICINE | Facility: CLINIC | Age: 39
End: 2024-02-29
Payer: MEDICAID

## 2024-02-29 ENCOUNTER — CLINICAL SUPPORT (OUTPATIENT)
Dept: FAMILY MEDICINE | Facility: CLINIC | Age: 39
End: 2024-02-29
Payer: MEDICAID

## 2024-02-29 VITALS
WEIGHT: 223.56 LBS | SYSTOLIC BLOOD PRESSURE: 98 MMHG | DIASTOLIC BLOOD PRESSURE: 70 MMHG | HEART RATE: 99 BPM | HEIGHT: 62 IN | OXYGEN SATURATION: 100 % | BODY MASS INDEX: 41.14 KG/M2 | RESPIRATION RATE: 18 BRPM

## 2024-02-29 DIAGNOSIS — D50.9 IRON DEFICIENCY ANEMIA, UNSPECIFIED IRON DEFICIENCY ANEMIA TYPE: ICD-10-CM

## 2024-02-29 DIAGNOSIS — E11.9 TYPE 2 DIABETES MELLITUS WITHOUT COMPLICATION, WITHOUT LONG-TERM CURRENT USE OF INSULIN: Primary | ICD-10-CM

## 2024-02-29 DIAGNOSIS — E78.2 MIXED HYPERLIPIDEMIA: ICD-10-CM

## 2024-02-29 DIAGNOSIS — K21.9 GASTROESOPHAGEAL REFLUX DISEASE, UNSPECIFIED WHETHER ESOPHAGITIS PRESENT: ICD-10-CM

## 2024-02-29 DIAGNOSIS — I10 ESSENTIAL HYPERTENSION: ICD-10-CM

## 2024-02-29 DIAGNOSIS — E11.9 TYPE 2 DIABETES MELLITUS WITHOUT COMPLICATION, WITHOUT LONG-TERM CURRENT USE OF INSULIN: ICD-10-CM

## 2024-02-29 DIAGNOSIS — E55.9 VITAMIN D DEFICIENCY: ICD-10-CM

## 2024-02-29 DIAGNOSIS — K59.09 OTHER CONSTIPATION: ICD-10-CM

## 2024-02-29 DIAGNOSIS — Z01.419 WELL WOMAN EXAM: ICD-10-CM

## 2024-02-29 DIAGNOSIS — K08.9 POOR DENTITION: ICD-10-CM

## 2024-02-29 DIAGNOSIS — E66.01 CLASS 3 SEVERE OBESITY WITH SERIOUS COMORBIDITY AND BODY MASS INDEX (BMI) OF 40.0 TO 44.9 IN ADULT, UNSPECIFIED OBESITY TYPE: ICD-10-CM

## 2024-02-29 DIAGNOSIS — R35.0 URINE FREQUENCY: ICD-10-CM

## 2024-02-29 PROBLEM — J98.4 RESTRICTIVE LUNG DISEASE: Status: ACTIVE | Noted: 2024-02-29

## 2024-02-29 PROBLEM — J45.20 MILD INTERMITTENT ASTHMA WITHOUT COMPLICATION: Status: ACTIVE | Noted: 2020-02-19

## 2024-02-29 LAB
25(OH)D3+25(OH)D2 SERPL-MCNC: 10 NG/ML (ref 30–96)
ALBUMIN SERPL BCP-MCNC: 3.4 G/DL (ref 3.5–5.2)
ALP SERPL-CCNC: 77 U/L (ref 55–135)
ALT SERPL W/O P-5'-P-CCNC: 14 U/L (ref 10–44)
ANION GAP SERPL CALC-SCNC: 9 MMOL/L (ref 8–16)
AST SERPL-CCNC: 10 U/L (ref 10–40)
BACTERIA SPEC CULT: NORMAL /HPF
BASOPHILS # BLD AUTO: 0.05 K/UL (ref 0–0.2)
BASOPHILS NFR BLD: 0.6 % (ref 0–1.9)
BILIRUB SERPL-MCNC: 0.2 MG/DL (ref 0.1–1)
BILIRUB SERPL-MCNC: NORMAL MG/DL
BLOOD URINE, POC: NORMAL
BUN SERPL-MCNC: 8 MG/DL (ref 6–20)
CALCIUM SERPL-MCNC: 9 MG/DL (ref 8.7–10.5)
CASTS: NORMAL
CHLORIDE SERPL-SCNC: 108 MMOL/L (ref 95–110)
CO2 SERPL-SCNC: 23 MMOL/L (ref 23–29)
COLOR, POC UA: YELLOW
CREAT SERPL-MCNC: 0.6 MG/DL (ref 0.5–1.4)
CRYSTALS: NORMAL
DIFFERENTIAL METHOD BLD: ABNORMAL
EOSINOPHIL # BLD AUTO: 0.1 K/UL (ref 0–0.5)
EOSINOPHIL NFR BLD: 1.4 % (ref 0–8)
ERYTHROCYTE [DISTWIDTH] IN BLOOD BY AUTOMATED COUNT: 14.7 % (ref 11.5–14.5)
EST. GFR  (NO RACE VARIABLE): >60 ML/MIN/1.73 M^2
ESTIMATED AVG GLUCOSE: 128 MG/DL (ref 68–131)
GLUCOSE SERPL-MCNC: 91 MG/DL (ref 70–110)
GLUCOSE UR QL STRIP: NORMAL
HBA1C MFR BLD: 6.1 % (ref 4–5.6)
HCT VFR BLD AUTO: 38.5 % (ref 37–48.5)
HGB BLD-MCNC: 11.9 G/DL (ref 12–16)
IMM GRANULOCYTES # BLD AUTO: 0.09 K/UL (ref 0–0.04)
IMM GRANULOCYTES NFR BLD AUTO: 1 % (ref 0–0.5)
IRON SERPL-MCNC: 64 UG/DL (ref 30–160)
KETONES UR QL STRIP: NORMAL
LEUKOCYTE ESTERASE URINE, POC: NORMAL
LYMPHOCYTES # BLD AUTO: 3 K/UL (ref 1–4.8)
LYMPHOCYTES NFR BLD: 35 % (ref 18–48)
MCH RBC QN AUTO: 26.1 PG (ref 27–31)
MCHC RBC AUTO-ENTMCNC: 30.9 G/DL (ref 32–36)
MCV RBC AUTO: 84 FL (ref 82–98)
MONOCYTES # BLD AUTO: 0.9 K/UL (ref 0.3–1)
MONOCYTES NFR BLD: 10.7 % (ref 4–15)
NEUTROPHILS # BLD AUTO: 4.5 K/UL (ref 1.8–7.7)
NEUTROPHILS NFR BLD: 51.3 % (ref 38–73)
NITRITE, POC UA: NORMAL
NRBC BLD-RTO: 0 /100 WBC
PH, POC UA: 6.5
PLATELET # BLD AUTO: 505 K/UL (ref 150–450)
PMV BLD AUTO: 9.3 FL (ref 9.2–12.9)
POTASSIUM SERPL-SCNC: 4 MMOL/L (ref 3.5–5.1)
PROT SERPL-MCNC: 7 G/DL (ref 6–8.4)
PROTEIN, POC: NORMAL
RBC # BLD AUTO: 4.56 M/UL (ref 4–5.4)
RBC CELLS COUNTED: NORMAL
SATURATED IRON: 16 % (ref 20–50)
SODIUM SERPL-SCNC: 140 MMOL/L (ref 136–145)
SPECIFIC GRAVITY, POC UA: 1.02
TOTAL IRON BINDING CAPACITY: 404 UG/DL (ref 250–450)
TRANSFERRIN SERPL-MCNC: 273 MG/DL (ref 200–375)
TSH SERPL DL<=0.005 MIU/L-ACNC: 0.77 UIU/ML (ref 0.4–4)
UROBILINOGEN, POC UA: 0.2
WBC # BLD AUTO: 8.69 K/UL (ref 3.9–12.7)
WHITE BLOOD CELLS: NORMAL

## 2024-02-29 PROCEDURE — 80053 COMPREHEN METABOLIC PANEL: CPT | Performed by: STUDENT IN AN ORGANIZED HEALTH CARE EDUCATION/TRAINING PROGRAM

## 2024-02-29 PROCEDURE — 85025 COMPLETE CBC W/AUTO DIFF WBC: CPT | Performed by: STUDENT IN AN ORGANIZED HEALTH CARE EDUCATION/TRAINING PROGRAM

## 2024-02-29 PROCEDURE — 82306 VITAMIN D 25 HYDROXY: CPT | Performed by: STUDENT IN AN ORGANIZED HEALTH CARE EDUCATION/TRAINING PROGRAM

## 2024-02-29 PROCEDURE — 3078F DIAST BP <80 MM HG: CPT | Mod: CPTII,,, | Performed by: STUDENT IN AN ORGANIZED HEALTH CARE EDUCATION/TRAINING PROGRAM

## 2024-02-29 PROCEDURE — 83540 ASSAY OF IRON: CPT | Performed by: STUDENT IN AN ORGANIZED HEALTH CARE EDUCATION/TRAINING PROGRAM

## 2024-02-29 PROCEDURE — 99999PBSHW POCT URINALYSIS, DIPSTICK OR TABLET REAGENT, AUTOMATED, WITH MICROSCOP: Mod: PBBFAC,,,

## 2024-02-29 PROCEDURE — 81001 URINALYSIS AUTO W/SCOPE: CPT | Mod: PBBFAC | Performed by: STUDENT IN AN ORGANIZED HEALTH CARE EDUCATION/TRAINING PROGRAM

## 2024-02-29 PROCEDURE — 99999PBSHW POCT URINE SEDIMENT EXAM: Mod: PBBFAC,,,

## 2024-02-29 PROCEDURE — 83036 HEMOGLOBIN GLYCOSYLATED A1C: CPT | Performed by: STUDENT IN AN ORGANIZED HEALTH CARE EDUCATION/TRAINING PROGRAM

## 2024-02-29 PROCEDURE — 81000 URINALYSIS NONAUTO W/SCOPE: CPT | Mod: PBBFAC | Performed by: STUDENT IN AN ORGANIZED HEALTH CARE EDUCATION/TRAINING PROGRAM

## 2024-02-29 PROCEDURE — 99215 OFFICE O/P EST HI 40 MIN: CPT | Mod: PBBFAC | Performed by: STUDENT IN AN ORGANIZED HEALTH CARE EDUCATION/TRAINING PROGRAM

## 2024-02-29 PROCEDURE — 99999PBSHW PR PBB SHADOW TECHNICAL ONLY FILED TO HB: Mod: PBBFAC,,,

## 2024-02-29 PROCEDURE — 3074F SYST BP LT 130 MM HG: CPT | Mod: CPTII,,, | Performed by: STUDENT IN AN ORGANIZED HEALTH CARE EDUCATION/TRAINING PROGRAM

## 2024-02-29 PROCEDURE — 36415 COLL VENOUS BLD VENIPUNCTURE: CPT | Mod: PBBFAC

## 2024-02-29 PROCEDURE — 3008F BODY MASS INDEX DOCD: CPT | Mod: CPTII,,, | Performed by: STUDENT IN AN ORGANIZED HEALTH CARE EDUCATION/TRAINING PROGRAM

## 2024-02-29 PROCEDURE — 84443 ASSAY THYROID STIM HORMONE: CPT | Performed by: STUDENT IN AN ORGANIZED HEALTH CARE EDUCATION/TRAINING PROGRAM

## 2024-02-29 PROCEDURE — 99999 PR PBB SHADOW E&M-EST. PATIENT-LVL V: CPT | Mod: PBBFAC,,, | Performed by: STUDENT IN AN ORGANIZED HEALTH CARE EDUCATION/TRAINING PROGRAM

## 2024-02-29 PROCEDURE — 1159F MED LIST DOCD IN RCRD: CPT | Mod: CPTII,,, | Performed by: STUDENT IN AN ORGANIZED HEALTH CARE EDUCATION/TRAINING PROGRAM

## 2024-02-29 PROCEDURE — 3072F LOW RISK FOR RETINOPATHY: CPT | Mod: CPTII,,, | Performed by: STUDENT IN AN ORGANIZED HEALTH CARE EDUCATION/TRAINING PROGRAM

## 2024-02-29 PROCEDURE — 99214 OFFICE O/P EST MOD 30 MIN: CPT | Mod: S$PBB,,, | Performed by: STUDENT IN AN ORGANIZED HEALTH CARE EDUCATION/TRAINING PROGRAM

## 2024-02-29 RX ORDER — SEMAGLUTIDE 0.68 MG/ML
0.5 INJECTION, SOLUTION SUBCUTANEOUS
Qty: 3 ML | Refills: 2 | Status: SHIPPED | OUTPATIENT
Start: 2024-02-29 | End: 2024-05-30

## 2024-02-29 NOTE — PROGRESS NOTES
Subjective:       Patient ID: Ethel Glass is a 38 y.o. female.    Chief Complaint: Follow-up (4 month follow up) and Urinary Frequency (X 3 days)    Pt here for follow-up    YURI: She has received IV iron in the past. She also had mild thombocytosis on labs. She is followed by Dr. Wade at [facility], hem/onc.    Constipation: she has been doing well. She uses linzess.     DM2.obesity: she is currently on glyxambi 25/5mg daily and metformin 500mg BID. Last A1c 5.6. she did gain 14lb since her last visit.    Vit D def: currently taking a vit D supplement.    HLD: tolerating statin.    GERD: she was on protonix and has been for years. Followed by GI.    HTN: doing well with spironolactone.    She is still followed by mental health. Meds include alprazolam, lexapro, and adderall.     She has been having uti symptoms with some frequency. No dysuria. No hematuria.    She has been having tooth issues. She reports teeth decay and needs extensive dental work.     Review of Systems   Constitutional:  Negative for chills and fever.   HENT:  Negative for congestion and sore throat.    Respiratory:  Negative for cough and shortness of breath.    Cardiovascular:  Negative for chest pain.   Gastrointestinal:  Negative for abdominal pain, constipation, diarrhea and vomiting.   Genitourinary:  Negative for dysuria and hematuria.   Musculoskeletal:  Positive for arthralgias.   Neurological:  Negative for dizziness, syncope and light-headedness.       Objective:      Physical Exam  Vitals reviewed.   Constitutional:       General: She is not in acute distress.  HENT:      Head: Normocephalic and atraumatic.   Eyes:      Conjunctiva/sclera: Conjunctivae normal.   Cardiovascular:      Rate and Rhythm: Normal rate and regular rhythm.      Heart sounds: Normal heart sounds. No murmur heard.  Pulmonary:      Effort: Pulmonary effort is normal. No respiratory distress.      Breath sounds: Normal breath sounds.   Neurological:       Mental Status: She is alert and oriented to person, place, and time.      Comments:      Psychiatric:         Mood and Affect: Mood normal.         Behavior: Behavior normal.         Assessment:       1. Type 2 diabetes mellitus without complication, without long-term current use of insulin    2. Urine frequency    3. Vitamin D deficiency    4. Mixed hyperlipidemia    5. Essential hypertension    6. Iron deficiency anemia, unspecified iron deficiency anemia type    7. Other constipation    8. Gastroesophageal reflux disease, unspecified whether esophagitis present    9. Class 3 severe obesity with serious comorbidity and body mass index (BMI) of 40.0 to 44.9 in adult, unspecified obesity type    10. Well woman exam            Plan:           1. Type 2 diabetes mellitus without complication, without long-term current use of insulin  -     CBC Auto Differential; Future; Expected date: 02/29/2024  -     Comprehensive Metabolic Panel; Future; Expected date: 02/29/2024  -     Hemoglobin A1C; Future; Expected date: 02/29/2024  -     semaglutide (OZEMPIC) 0.25 mg or 0.5 mg (2 mg/3 mL) pen injector; Inject 0.5 mg into the skin every 7 days.  Dispense: 3 mL; Refill: 2    2. Urine frequency  -     POCT URINE DIPSTICK WITH MICROSCOPE, AUTOMATED  -     POCT Urine Sediment Exam    3. Vitamin D deficiency  -     Vitamin D; Future; Expected date: 02/29/2024    4. Mixed hyperlipidemia  -     CBC Auto Differential; Future; Expected date: 02/29/2024  -     Comprehensive Metabolic Panel; Future; Expected date: 02/29/2024  -     TSH; Future; Expected date: 02/29/2024    5. Essential hypertension  -     CBC Auto Differential; Future; Expected date: 02/29/2024  -     Comprehensive Metabolic Panel; Future; Expected date: 02/29/2024  -     TSH; Future; Expected date: 02/29/2024    6. Iron deficiency anemia, unspecified iron deficiency anemia type  -     IRON AND TIBC; Future; Expected date: 02/29/2024    7. Other constipation    8.  Gastroesophageal reflux disease, unspecified whether esophagitis present    9. Class 3 severe obesity with serious comorbidity and body mass index (BMI) of 40.0 to 44.9 in adult, unspecified obesity type  -     semaglutide (OZEMPIC) 0.25 mg or 0.5 mg (2 mg/3 mL) pen injector; Inject 0.5 mg into the skin every 7 days.  Dispense: 3 mL; Refill: 2    10. Well woman exam  -     Ambulatory referral/consult to Obstetrics / Gynecology; Future; Expected date: 03/07/2024    Urine dip unremarkable  Cont current meds except will stop glyxambi and start ozempic to see if we can help with weight loss.  Follow-up psych as scheduled  Follow-up Dr. Wade, hem/onc, as scheduled  Advised patient to call [facility] to see if she can get in there for her needed dental work  RTC 3 months or sooner if needed

## 2024-03-02 DIAGNOSIS — E55.9 VITAMIN D DEFICIENCY: Primary | ICD-10-CM

## 2024-03-02 RX ORDER — ACETAMINOPHEN 500 MG
5000 TABLET ORAL DAILY
Qty: 90 TABLET | Refills: 1 | Status: SHIPPED | OUTPATIENT
Start: 2024-03-02

## 2024-03-02 NOTE — PROGRESS NOTES
Please inform the pt recent labs stable but her vit D has dropped. I increased her supplement and sent to pharmacy. No other changes.

## 2024-04-08 DIAGNOSIS — K59.09 OTHER CONSTIPATION: ICD-10-CM

## 2024-04-08 NOTE — TELEPHONE ENCOUNTER
No care due was identified.  [facility] Embedded Care Due Messages. Reference number: [id number].   4/08/2024 8:24:38 AM CDT

## 2024-04-09 RX ORDER — LINACLOTIDE 145 UG/1
CAPSULE, GELATIN COATED ORAL
Qty: 30 CAPSULE | Refills: 5 | Status: SHIPPED | OUTPATIENT
Start: 2024-04-09 | End: 2024-04-27 | Stop reason: SDUPTHER

## 2024-04-12 NOTE — TELEPHONE ENCOUNTER
No care due was identified.  Health [facility] Embedded Care Due Messages. Reference number: [id number].   4/12/2024 8:03:43 AM CDT

## 2024-04-13 RX ORDER — ALBUTEROL SULFATE 0.83 MG/ML
SOLUTION RESPIRATORY (INHALATION)
Qty: 810 ML | Refills: 3 | Status: SHIPPED | OUTPATIENT
Start: 2024-04-13

## 2024-04-13 NOTE — TELEPHONE ENCOUNTER
Refill Decision Note   Kateysegundo Quan  is requesting a refill authorization.  Brief Assessment and Rationale for Refill:  Approve     Medication Therapy Plan:         Comments:     Note composed:7:59 AM 04/13/2024

## 2024-04-24 DIAGNOSIS — M54.50 CHRONIC MIDLINE LOW BACK PAIN, UNSPECIFIED WHETHER SCIATICA PRESENT: ICD-10-CM

## 2024-04-24 DIAGNOSIS — G89.29 CHRONIC MIDLINE LOW BACK PAIN, UNSPECIFIED WHETHER SCIATICA PRESENT: ICD-10-CM

## 2024-04-24 RX ORDER — GABAPENTIN 300 MG/1
300 CAPSULE ORAL 2 TIMES DAILY
Qty: 60 CAPSULE | Refills: 5 | Status: SHIPPED | OUTPATIENT
Start: 2024-04-24

## 2024-04-24 NOTE — TELEPHONE ENCOUNTER
No care due was identified.  [facility] Embedded Care Due Messages. Reference number: [id number].   4/24/2024 8:02:11 AM CDT

## 2024-04-27 DIAGNOSIS — J30.2 SEASONAL ALLERGIES: ICD-10-CM

## 2024-04-27 DIAGNOSIS — M54.50 CHRONIC MIDLINE LOW BACK PAIN, UNSPECIFIED WHETHER SCIATICA PRESENT: ICD-10-CM

## 2024-04-27 DIAGNOSIS — K21.9 GASTROESOPHAGEAL REFLUX DISEASE, UNSPECIFIED WHETHER ESOPHAGITIS PRESENT: ICD-10-CM

## 2024-04-27 DIAGNOSIS — K59.09 OTHER CONSTIPATION: ICD-10-CM

## 2024-04-27 DIAGNOSIS — G89.29 CHRONIC MIDLINE LOW BACK PAIN, UNSPECIFIED WHETHER SCIATICA PRESENT: ICD-10-CM

## 2024-04-28 NOTE — TELEPHONE ENCOUNTER
No care due was identified.  [facility] Embedded Care Due Messages. Reference number: [id number].   4/27/2024 9:04:16 PM CDT

## 2024-04-29 ENCOUNTER — TELEPHONE (OUTPATIENT)
Dept: FAMILY MEDICINE | Facility: CLINIC | Age: 39
End: 2024-04-29
Payer: MEDICAID

## 2024-04-29 NOTE — TELEPHONE ENCOUNTER
----- Message from Lo Guzman sent at 2024 11:53 AM CDT -----  Contact: ade Glass  MRN: [id number]  : 1985  PCP: Raad Orta  Home Phone      [phone]  Work Phone      Not on file.  Mobile          [phone]  Mobile          [phone]  Home Phone      [phone]  Home Phone      Not on file.      MESSAGE:   Patient is asking for a referral to be sent to [facility] dental, please advise      Fax  [phone]

## 2024-04-29 NOTE — TELEPHONE ENCOUNTER
Pt notified, referral faxed to [phone]   exhibits no edema or deformity. Neurological: She is alert. Answering questions appropriately. No gaze deficit. No gait abnormality. Moving all extremities. Skin: Skin is warm and dry. Psychiatric: She has a normal mood and affect. Judgment normal.       EMERGENCY DEPARTMENT COURSE and DIFFERENTIAL DIAGNOSIS/MDM:   Vitals:    Vitals:    10/09/18 1024 10/09/18 1130   BP: (!) 139/95 (!) 150/75   Pulse: 77 79   Resp: 16 13   Temp: 98.5 °F (36.9 °C)    TempSrc: Oral    SpO2: 99%    Weight: 180 lb (81.6 kg)    Height: 5' 6\" (1.676 m)        Patient presents to the emergency department with a complaint described above. Vitals are grossly normal, she is nontoxic, physical exam is unremarkable with the exception of dry mucous membranes. At this time, I suspect anxiety is the predominant cause of her symptoms but I am going to get routine blood work, an EKG and a single troponin. Based on her age, certainly her nausea and weakness could be a presentation of acute coronary syndrome although this would be atypical.      DIAGNOSTIC RESULTS     LABS:  Labs Reviewed   CBC - Abnormal; Notable for the following:        Result Value    MCH 32.2 (*)     All other components within normal limits   BASIC METABOLIC PANEL - Abnormal; Notable for the following: Anion Gap 16 (*)     Glucose 119 (*)     CREATININE 0.49 (*)     All other components within normal limits   URINE RT REFLEX TO CULTURE - Abnormal; Notable for the following:     Blood, Urine TRACE (*)     Leukocyte Esterase, Urine SMALL (*)     All other components within normal limits   URINE CULTURE   MAGNESIUM   TROPONIN   MICROSCOPIC URINALYSIS       All other labs were within normal range or not returned as of this dictation.     No orders to display         ED Course as of Oct 09 1238   Tue Oct 09, 2018   1117 Twelve lead EKG interpreted by emergency department physician who either signs or cosigns this chart in the absence of a cardiologist:  A 12 lead EKG

## 2024-04-29 NOTE — TELEPHONE ENCOUNTER
LOV 02/29/2024      Patient requesting refill for   cetirizine (ZYRTEC) 10 MG tablet 90 tablet 2 8/9/2023     LINZESS 145 mcg Cap capsule 30 capsule 5 4/9/2024     pantoprazole (PROTONIX) 40 MG tablet 90 tablet 3 10/17/2023     cyclobenzaprine (FLEXERIL) 10 MG tablet 15 tablet 2 2/20/2024     Rx pending   Pharmacy confirmed  Please advise

## 2024-04-30 RX ORDER — CETIRIZINE HYDROCHLORIDE 10 MG/1
TABLET ORAL
Qty: 90 TABLET | Refills: 1 | Status: SHIPPED | OUTPATIENT
Start: 2024-04-30

## 2024-04-30 RX ORDER — PANTOPRAZOLE SODIUM 40 MG/1
40 TABLET, DELAYED RELEASE ORAL DAILY
Qty: 90 TABLET | Refills: 1 | Status: SHIPPED | OUTPATIENT
Start: 2024-04-30

## 2024-04-30 RX ORDER — CYCLOBENZAPRINE HCL 10 MG
10 TABLET ORAL NIGHTLY
Qty: 15 TABLET | Refills: 2 | Status: SHIPPED | OUTPATIENT
Start: 2024-04-30

## 2024-05-01 DIAGNOSIS — E11.9 TYPE 2 DIABETES MELLITUS WITHOUT COMPLICATION, WITHOUT LONG-TERM CURRENT USE OF INSULIN: ICD-10-CM

## 2024-05-01 RX ORDER — METFORMIN HYDROCHLORIDE 500 MG/1
500 TABLET ORAL 2 TIMES DAILY
Qty: 180 TABLET | Refills: 1 | Status: SHIPPED | OUTPATIENT
Start: 2024-05-01

## 2024-05-01 NOTE — TELEPHONE ENCOUNTER
No care due was identified.  [facility] Embedded Care Due Messages. Reference number: [id number].   5/01/2024 11:31:29 AM CDT

## 2024-05-02 NOTE — TELEPHONE ENCOUNTER
Refill Decision Note   Kateysegundo Quan  is requesting a refill authorization.  Brief Assessment and Rationale for Refill:  Approve     Medication Therapy Plan:         Comments:     Note composed:9:56 PM 05/01/2024

## 2024-05-15 ENCOUNTER — TELEPHONE (OUTPATIENT)
Dept: FAMILY MEDICINE | Facility: CLINIC | Age: 39
End: 2024-05-15
Payer: MEDICAID

## 2024-05-15 DIAGNOSIS — J45.20 MILD INTERMITTENT ASTHMA WITHOUT COMPLICATION: Primary | ICD-10-CM

## 2024-05-15 NOTE — TELEPHONE ENCOUNTER
----- Message from Fawad Newman sent at 5/15/2024 11:52 AM CDT -----  Contact: Hussein Glass  MRN: [id number]  : 1985  PCP: Raad Orta  Home Phone      [phone]  Work Phone      Not on file.  Mobile          [phone]  Mobile          [phone]  Home Phone      [phone]  Home Phone      Not on file.      MESSAGE: Nebulizer broke - requesting Rx for a new one be sent to [facility]    Call [phone]    PCP: You

## 2024-05-30 DIAGNOSIS — E11.9 TYPE 2 DIABETES MELLITUS WITHOUT COMPLICATION, WITHOUT LONG-TERM CURRENT USE OF INSULIN: ICD-10-CM

## 2024-05-30 DIAGNOSIS — E66.01 CLASS 3 SEVERE OBESITY WITH SERIOUS COMORBIDITY AND BODY MASS INDEX (BMI) OF 40.0 TO 44.9 IN ADULT, UNSPECIFIED OBESITY TYPE: ICD-10-CM

## 2024-05-30 RX ORDER — SEMAGLUTIDE 0.68 MG/ML
0.5 INJECTION, SOLUTION SUBCUTANEOUS
Qty: 9 ML | Refills: 0 | Status: SHIPPED | OUTPATIENT
Start: 2024-05-30

## 2024-05-30 NOTE — TELEPHONE ENCOUNTER
Provider Staff:  Action required for this patient    Requires labs      Please see care gap opportunities below in Care Due Message.    Thanks!  [facility]     Appointments      Date Provider   Last Visit   2/29/2024 Raad Orta MD   Next Visit   6/12/2024 Raad Orta MD     Refill Decision Note   Ethel Glass  is requesting a refill authorization.    Brief Assessment and Rationale for Refill:  Approve       Medication Therapy Plan:         Comments:     Note composed:2:42 PM 05/30/2024

## 2024-05-30 NOTE — TELEPHONE ENCOUNTER
Care Due:                  Date            Visit Type   Department     Provider  --------------------------------------------------------------------------------                                EP -                              [facility]  Last Visit: 02-      CARE ([facility])   SHA Orta                              EP -                              [facility]  Next Visit: 06-      CARE ([facility])   MEDICINE       Raad Orta                                                            Last  Test          Frequency    Reason                     Performed    Due Date  --------------------------------------------------------------------------------    HBA1C.......  6 months...  metFORMIN, semaglutide...  03- 08-    [facility] Embedded Care Due Messages. Reference number: [id number].   5/30/2024 1:41:16 PM CDT

## 2024-06-11 ENCOUNTER — TELEPHONE (OUTPATIENT)
Dept: FAMILY MEDICINE | Facility: CLINIC | Age: 39
End: 2024-06-11
Payer: MEDICAID

## 2024-06-11 NOTE — TELEPHONE ENCOUNTER
----- Message from Lo Guzman sent at 6/10/2024 11:50 AM CDT -----  Contact: self  Ethel Glass  MRN: [id number]  : 1985  PCP: Raad Orta  Home Phone      [phone]  Work Phone      Not on file.  Mobile          [phone]  Mobile          [phone]  Home Phone      [phone]  Home Phone      Not on file.      MESSAGE:   Patient is asking for the nurse to give her  call back regarding her nebulizer      [phone]

## 2024-06-21 DIAGNOSIS — E78.2 MIXED HYPERLIPIDEMIA: ICD-10-CM

## 2024-06-21 RX ORDER — ALBUTEROL SULFATE 0.83 MG/ML
2.5 SOLUTION RESPIRATORY (INHALATION) EVERY 6 HOURS PRN
Qty: 810 ML | Refills: 1 | Status: SHIPPED | OUTPATIENT
Start: 2024-06-21

## 2024-06-21 RX ORDER — ROSUVASTATIN CALCIUM 40 MG/1
40 TABLET, COATED ORAL NIGHTLY
Qty: 90 TABLET | Refills: 3 | Status: SHIPPED | OUTPATIENT
Start: 2024-06-21

## 2024-06-21 NOTE — TELEPHONE ENCOUNTER
No care due was identified.  [facility] Embedded Care Due Messages. Reference number: [id number].   6/21/2024 1:56:27 PM CDT

## 2024-06-21 NOTE — TELEPHONE ENCOUNTER
LOV 2/29/2024    Pt requesting RX refill for   rosuvastatin (CRESTOR) 40 MG Tab   albuterol (PROVENTIL) 2.5 mg /3 mL (0.083 %) nebulizer solution     pharmacy confirmed   rx pending  please advise

## 2024-07-02 DIAGNOSIS — G89.29 CHRONIC MIDLINE LOW BACK PAIN, UNSPECIFIED WHETHER SCIATICA PRESENT: ICD-10-CM

## 2024-07-02 DIAGNOSIS — M54.50 CHRONIC MIDLINE LOW BACK PAIN, UNSPECIFIED WHETHER SCIATICA PRESENT: ICD-10-CM

## 2024-07-02 DIAGNOSIS — I10 ESSENTIAL HYPERTENSION: ICD-10-CM

## 2024-07-02 DIAGNOSIS — J30.2 SEASONAL ALLERGIES: ICD-10-CM

## 2024-07-02 RX ORDER — CETIRIZINE HYDROCHLORIDE 10 MG/1
TABLET ORAL
Qty: 90 TABLET | Refills: 2 | Status: SHIPPED | OUTPATIENT
Start: 2024-07-02

## 2024-07-02 RX ORDER — SPIRONOLACTONE 100 MG/1
100 TABLET, FILM COATED ORAL
Qty: 90 TABLET | Refills: 2 | Status: SHIPPED | OUTPATIENT
Start: 2024-07-02

## 2024-07-02 NOTE — TELEPHONE ENCOUNTER
Refill Routing Note   Medication(s) are not appropriate for processing by [facility] for the following reason(s):        Outside of protocol    [facility] action(s):  Route  Approve             Appointments  past 12m or future 3m with PCP    Date Provider   Last Visit   2/29/2024 Raad Orta MD   Next Visit   7/12/2024 Raad Orta MD   ED visits in past 90 days: 0        Note composed:6:06 PM 07/02/2024

## 2024-07-02 NOTE — TELEPHONE ENCOUNTER
No care due was identified.  [facility] Embedded Care Due Messages. Reference number: [id number].   7/02/2024 1:42:04 PM CDT

## 2024-07-03 RX ORDER — CYCLOBENZAPRINE HCL 10 MG
10 TABLET ORAL NIGHTLY
Qty: 15 TABLET | Refills: 2 | Status: SHIPPED | OUTPATIENT
Start: 2024-07-03

## 2024-07-08 DIAGNOSIS — K59.09 OTHER CONSTIPATION: ICD-10-CM

## 2024-07-08 DIAGNOSIS — G89.29 CHRONIC MIDLINE LOW BACK PAIN, UNSPECIFIED WHETHER SCIATICA PRESENT: ICD-10-CM

## 2024-07-08 DIAGNOSIS — E87.6 HYPOKALEMIA: ICD-10-CM

## 2024-07-08 DIAGNOSIS — M54.50 CHRONIC MIDLINE LOW BACK PAIN, UNSPECIFIED WHETHER SCIATICA PRESENT: ICD-10-CM

## 2024-07-08 NOTE — TELEPHONE ENCOUNTER
No care due was identified.  [facility] Embedded Care Due Messages. Reference number: [id number].   7/08/2024 1:22:50 PM CDT

## 2024-07-09 RX ORDER — GABAPENTIN 300 MG/1
300 CAPSULE ORAL 2 TIMES DAILY
Qty: 60 CAPSULE | Refills: 5 | Status: SHIPPED | OUTPATIENT
Start: 2024-07-09

## 2024-07-09 RX ORDER — POTASSIUM CHLORIDE 20 MEQ/1
20 TABLET, EXTENDED RELEASE ORAL 2 TIMES DAILY
Qty: 180 TABLET | Refills: 2 | Status: SHIPPED | OUTPATIENT
Start: 2024-07-09

## 2024-07-12 ENCOUNTER — TELEPHONE (OUTPATIENT)
Dept: FAMILY MEDICINE | Facility: CLINIC | Age: 39
End: 2024-07-12

## 2024-07-12 NOTE — TELEPHONE ENCOUNTER
Ethel Glass  MRN: [id number]  : 1985  PCP: Raad Orta  Home Phone [phone]   Work Phone Not on file.   Mobile [phone]   Mobile [phone]   Home Phone [phone]   Home Phone Not on file.       MESSAGE: patient missed todays appointment, asking for a sooner appointment than I can schedule. Would like call back from nurse.      Phone [phone]

## 2024-07-24 LAB
LEFT EYE DM RETINOPATHY: NEGATIVE
RIGHT EYE DM RETINOPATHY: NEGATIVE

## 2024-08-02 DIAGNOSIS — E11.9 TYPE 2 DIABETES MELLITUS WITHOUT COMPLICATION, WITHOUT LONG-TERM CURRENT USE OF INSULIN: ICD-10-CM

## 2024-08-02 DIAGNOSIS — E66.01 CLASS 3 SEVERE OBESITY WITH SERIOUS COMORBIDITY AND BODY MASS INDEX (BMI) OF 40.0 TO 44.9 IN ADULT, UNSPECIFIED OBESITY TYPE: ICD-10-CM

## 2024-08-02 RX ORDER — SEMAGLUTIDE 0.68 MG/ML
0.5 INJECTION, SOLUTION SUBCUTANEOUS
Qty: 9 ML | Refills: 0 | Status: SHIPPED | OUTPATIENT
Start: 2024-08-02

## 2024-08-02 NOTE — TELEPHONE ENCOUNTER
----- Message from Niles Butler sent at 2024  3:05 PM CDT -----  Contact: pt  Ethel Glass  MRN: [id number]  : 1985  PCP: Raad Orta  Home Phone      [phone]  Work Phone      Not on file.  Mobile          [phone]  Mobile          [phone]  Home Phone      [phone]  Home Phone      Not on file.      MESSAGE:     Pt called wanting to speak with nurse about a couple medications that weren't sent in.      Please advise  [phone]

## 2024-08-02 NOTE — TELEPHONE ENCOUNTER
Care Due:                  Date            Visit Type   Department     Provider  --------------------------------------------------------------------------------                                EP -                              PRIMARY      [facility] FAMILY  Last Visit: 02-      CARE ([facility])   MEDICINE       Raad Orta  Next Visit: None Scheduled  None         None Found                                                            Last  Test          Frequency    Reason                     Performed    Due Date  --------------------------------------------------------------------------------    HBA1C.......  6 months...  metFORMIN, semaglutide...  03- 08-    Lipid Panel.  12 months..  rosuvastatin.............  10-   10-    [employer] Embedded Care Due Messages. Reference number: [id number].   8/02/2024 3:26:10 PM CDT

## 2024-08-05 ENCOUNTER — TELEPHONE (OUTPATIENT)
Dept: FAMILY MEDICINE | Facility: CLINIC | Age: 39
End: 2024-08-05
Payer: MEDICAID

## 2024-08-16 ENCOUNTER — HOSPITAL ENCOUNTER (EMERGENCY)
Facility: HOSPITAL | Age: 39
Discharge: HOME | End: 2024-08-16
Attending: STUDENT IN AN ORGANIZED HEALTH CARE EDUCATION/TRAINING PROGRAM
Payer: MEDICAID

## 2024-08-16 VITALS
DIASTOLIC BLOOD PRESSURE: 74 MMHG | BODY MASS INDEX: 42.06 KG/M2 | SYSTOLIC BLOOD PRESSURE: 114 MMHG | OXYGEN SATURATION: 99 % | HEART RATE: 89 BPM | RESPIRATION RATE: 20 BRPM | WEIGHT: 229.94 LBS | TEMPERATURE: 98 F

## 2024-08-16 DIAGNOSIS — M94.0 COSTOCHONDRITIS: Primary | ICD-10-CM

## 2024-08-16 DIAGNOSIS — R07.89 CHEST WALL PAIN: ICD-10-CM

## 2024-08-16 LAB
ALBUMIN SERPL BCP-MCNC: 3.7 G/DL (ref 3.5–5.2)
ALP SERPL-CCNC: 81 U/L (ref 55–135)
ALT SERPL W/O P-5'-P-CCNC: 24 U/L (ref 10–44)
ANION GAP SERPL CALC-SCNC: 10 MMOL/L (ref 8–16)
AST SERPL-CCNC: 12 U/L (ref 10–40)
BASOPHILS # BLD AUTO: 0.03 K/UL (ref 0–0.2)
BASOPHILS NFR BLD: 0.5 % (ref 0–1.9)
BILIRUB SERPL-MCNC: 0.3 MG/DL (ref 0.1–1)
BUN SERPL-MCNC: 9 MG/DL (ref 6–20)
CALCIUM SERPL-MCNC: 10.2 MG/DL (ref 8.7–10.5)
CHLORIDE SERPL-SCNC: 106 MMOL/L (ref 95–110)
CO2 SERPL-SCNC: 22 MMOL/L (ref 23–29)
CREAT SERPL-MCNC: 0.7 MG/DL (ref 0.5–1.4)
D DIMER PPP IA.FEU-MCNC: 0.76 MG/L FEU
DIFFERENTIAL METHOD BLD: ABNORMAL
EOSINOPHIL # BLD AUTO: 0.1 K/UL (ref 0–0.5)
EOSINOPHIL NFR BLD: 1.8 % (ref 0–8)
ERYTHROCYTE [DISTWIDTH] IN BLOOD BY AUTOMATED COUNT: 15.8 % (ref 11.5–14.5)
EST. GFR  (NO RACE VARIABLE): >60 ML/MIN/1.73 M^2
GLUCOSE SERPL-MCNC: 100 MG/DL (ref 70–110)
GROUP A STREP, MOLECULAR: NEGATIVE
HCT VFR BLD AUTO: 38.6 % (ref 37–48.5)
HGB BLD-MCNC: 12 G/DL (ref 12–16)
IMM GRANULOCYTES # BLD AUTO: 0.03 K/UL (ref 0–0.04)
IMM GRANULOCYTES NFR BLD AUTO: 0.5 % (ref 0–0.5)
INFLUENZA A, MOLECULAR: NEGATIVE
INFLUENZA B, MOLECULAR: NEGATIVE
LYMPHOCYTES # BLD AUTO: 2.2 K/UL (ref 1–4.8)
LYMPHOCYTES NFR BLD: 33.4 % (ref 18–48)
MCH RBC QN AUTO: 26.2 PG (ref 27–31)
MCHC RBC AUTO-ENTMCNC: 31.1 G/DL (ref 32–36)
MCV RBC AUTO: 84 FL (ref 82–98)
MONOCYTES # BLD AUTO: 0.5 K/UL (ref 0.3–1)
MONOCYTES NFR BLD: 7.7 % (ref 4–15)
NEUTROPHILS # BLD AUTO: 3.6 K/UL (ref 1.8–7.7)
NEUTROPHILS NFR BLD: 56.1 % (ref 38–73)
NRBC BLD-RTO: 0 /100 WBC
PLATELET # BLD AUTO: 411 K/UL (ref 150–450)
PMV BLD AUTO: 9 FL (ref 9.2–12.9)
POTASSIUM SERPL-SCNC: 3.8 MMOL/L (ref 3.5–5.1)
PROT SERPL-MCNC: 7.4 G/DL (ref 6–8.4)
RBC # BLD AUTO: 4.58 M/UL (ref 4–5.4)
SARS-COV-2 RDRP RESP QL NAA+PROBE: NEGATIVE
SODIUM SERPL-SCNC: 138 MMOL/L (ref 136–145)
SPECIMEN SOURCE: NORMAL
TROPONIN I SERPL DL<=0.01 NG/ML-MCNC: <0.006 NG/ML (ref 0–0.03)
WBC # BLD AUTO: 6.49 K/UL (ref 3.9–12.7)

## 2024-08-16 PROCEDURE — 93010 ELECTROCARDIOGRAM REPORT: CPT | Mod: ,,, | Performed by: INTERNAL MEDICINE

## 2024-08-16 PROCEDURE — 93005 ELECTROCARDIOGRAM TRACING: CPT

## 2024-08-16 PROCEDURE — 84484 ASSAY OF TROPONIN QUANT: CPT | Performed by: NURSE PRACTITIONER

## 2024-08-16 PROCEDURE — 25500020 PHARM REV CODE 255: Performed by: STUDENT IN AN ORGANIZED HEALTH CARE EDUCATION/TRAINING PROGRAM

## 2024-08-16 PROCEDURE — 87651 STREP A DNA AMP PROBE: CPT | Performed by: STUDENT IN AN ORGANIZED HEALTH CARE EDUCATION/TRAINING PROGRAM

## 2024-08-16 PROCEDURE — 87502 INFLUENZA DNA AMP PROBE: CPT | Performed by: STUDENT IN AN ORGANIZED HEALTH CARE EDUCATION/TRAINING PROGRAM

## 2024-08-16 PROCEDURE — U0002 COVID-19 LAB TEST NON-CDC: HCPCS | Performed by: STUDENT IN AN ORGANIZED HEALTH CARE EDUCATION/TRAINING PROGRAM

## 2024-08-16 PROCEDURE — 99285 EMERGENCY DEPT VISIT HI MDM: CPT | Mod: 25

## 2024-08-16 PROCEDURE — 80053 COMPREHEN METABOLIC PANEL: CPT | Performed by: NURSE PRACTITIONER

## 2024-08-16 PROCEDURE — 85379 FIBRIN DEGRADATION QUANT: CPT | Performed by: NURSE PRACTITIONER

## 2024-08-16 PROCEDURE — 85025 COMPLETE CBC W/AUTO DIFF WBC: CPT | Performed by: NURSE PRACTITIONER

## 2024-08-16 RX ORDER — PREDNISONE 20 MG/1
40 TABLET ORAL DAILY
Qty: 10 TABLET | Refills: 0 | Status: SHIPPED | OUTPATIENT
Start: 2024-08-16 | End: 2024-08-20

## 2024-08-16 RX ORDER — KETOROLAC TROMETHAMINE 10 MG/1
10 TABLET, FILM COATED ORAL EVERY 6 HOURS
Qty: 20 TABLET | Refills: 0 | Status: SHIPPED | OUTPATIENT
Start: 2024-08-16 | End: 2024-08-20

## 2024-08-16 RX ADMIN — IOHEXOL 100 ML: 350 INJECTION, SOLUTION INTRAVENOUS at 03:08

## 2024-08-16 NOTE — ED TRIAGE NOTES
39 y.o. female presents to ER Room/bed info not found   Chief Complaint   Patient presents with    General Illness   .   C/o cough for a few days, c/o left sided chest pain since yesterday worse with movement and coughing

## 2024-08-16 NOTE — Clinical Note
"Ethel Shields" Quan was seen and treated in our emergency department on 8/16/2024.  She may return to work on 08/19/2024.       If you have any questions or concerns, please don't hesitate to call.      Paty Duke NP"

## 2024-08-16 NOTE — ED PROVIDER NOTES
Encounter Date: 2024       History     Chief Complaint   Patient presents with    General Illness     Chief complaint: Chest tightness   39 year old female with history of COPD bipolar diabetes GERD presents to be evaluated for pain with deep inspiration and right chest wall pain that is worse with motion and positions.  She reports that began yesterday.  She reports she has been coughing.  She reports she was seen at urgent care and referred here for CT chest.  Denies any shortness of breath or chest pain at this time.  Denies palpitations.  States she recently had a URI      Review of patient's allergies indicates:   Allergen Reactions    Latex, natural rubber Hives    Pcn [penicillins] Hives     Past Medical History:   Diagnosis Date    Bipolar 1 disorder     COPD (chronic obstructive pulmonary disease)     Diabetes mellitus     Diabetes mellitus, type 2     Fibrous dysplasia (monostotic), other site     right hip    GERD (gastroesophageal reflux disease)     Post traumatic stress disorder (PTSD)      Past Surgical History:   Procedure Laterality Date     SECTION      CHOLECYSTECTOMY      HIP SURGERY      Wrist Left       Family History   Problem Relation Name Age of Onset    Hypertension Mother Franca Howard     Diabetes Mother Franca Howard     Arthritis Mother Franca Howard     Mental illness Mother Franca Howard     Stroke Mother Franca Barbosaley     COPD Mother Franca Barbosaley     Depression Mother Franca Howard     Asthma Father Mauro Clemente diseased     Diabetes Father Mauro Clemente diseased     Hypertension Father Mauro Clemente diseased     Cancer Father Mauro Clemente diseased     Arthritis Father Mauro Clemente diseased     Intellectual disability Sister Rossy Quan     Hypertension Sister Rossy Quan     Early death Sister Rossy Quan     Learning disabilities Sister Rossy Quan     Asthma Sister Rossy Quan     Hypertension Brother Raad howard      Learning disabilities Brother Raad glass     Learning disabilities Son Abdiaziz tay jr     Learning disabilities Son Clem Glass      Social History     Tobacco Use    Smoking status: Never    Smokeless tobacco: Never   Substance Use Topics    Alcohol use: No    Drug use: No     Review of Systems   Constitutional:  Negative for fatigue and fever.   Respiratory:  Positive for cough and chest tightness. Negative for shortness of breath.    Cardiovascular:  Positive for chest pain. Negative for palpitations.   Gastrointestinal:  Negative for abdominal pain.   Musculoskeletal:  Positive for myalgias. Negative for arthralgias.       Physical Exam     Initial Vitals [08/16/24 1334]   BP Pulse Resp Temp SpO2   114/74 89 20 98.2 °F (36.8 °C) 99 %      MAP       --         Physical Exam    Nursing note and vitals reviewed.  Constitutional: She appears well-developed and well-nourished.   HENT:   Head: Normocephalic and atraumatic.   Cardiovascular:  Normal rate and regular rhythm.     Exam reveals no gallop and no friction rub.       No murmur heard.  Pulmonary/Chest: She has no wheezes. She has no rhonchi. She has no rales.   Right-sided reproducible chest pain   Abdominal: Abdomen is soft. She exhibits no distension. There is no rebound.     Neurological: She is alert and oriented to person, place, and time.   Skin: Skin is warm and dry.   Psychiatric: She has a normal mood and affect. Thought content normal.         ED Course   Procedures  Labs Reviewed   COMPREHENSIVE METABOLIC PANEL - Abnormal       Result Value    Sodium 138      Potassium 3.8      Chloride 106      CO2 22 (*)     Glucose 100      BUN 9      Creatinine 0.7      Calcium 10.2      Total Protein 7.4      Albumin 3.7      Total Bilirubin 0.3      Alkaline Phosphatase 81      AST 12      ALT 24      eGFR >60      Anion Gap 10     D DIMER, QUANTITATIVE - Abnormal    D-Dimer 0.76 (*)    CBC W/ AUTO DIFFERENTIAL - Abnormal    WBC 6.49      RBC 4.58       Hemoglobin 12.0      Hematocrit 38.6      MCV 84      MCH 26.2 (*)     MCHC 31.1 (*)     RDW 15.8 (*)     Platelets 411      MPV 9.0 (*)     Immature Granulocytes 0.5      Gran # (ANC) 3.6      Immature Grans (Abs) 0.03      Lymph # 2.2      Mono # 0.5      Eos # 0.1      Baso # 0.03      nRBC 0      Gran % 56.1      Lymph % 33.4      Mono % 7.7      Eosinophil % 1.8      Basophil % 0.5      Differential Method Automated     INFLUENZA A & B BY MOLECULAR    Influenza A, Molecular Negative      Influenza B, Molecular Negative      Flu A & B Source Nasal swab     GROUP A STREP, MOLECULAR    Group A Strep, Molecular Negative     TROPONIN I    Troponin I <0.006     SARS-COV-2 RNA AMPLIFICATION, QUAL    SARS-CoV-2 RNA, Amplification, Qual Negative       EKG Readings: (Independently Interpreted)   Initial Reading: No STEMI. Previous EKG: Compared with most recent EKG Rhythm: Normal Sinus Rhythm. Heart Rate: 84. Ectopy: No Ectopy. Conduction: Normal.       Imaging Results              CTA Chest Non-Coronary (PE Studies) (Final result)  Result time 08/16/24 15:17:56      Final result by Darlin Agudelo MD (08/16/24 15:17:56)                   Impression:      As visualized no intraluminal filling defects in pulmonary artery suggesting pulmonary artery embolism    Findings detailed above.      Electronically signed by: Darlin Agudelo MD  Date:    08/16/2024  Time:    15:17               Narrative:    EXAMINATION:  CTA CHEST NON CORONARY (PE STUDIES)    CLINICAL HISTORY:  Pulmonary embolism (PE) suspected, unknown D-dimer;    TECHNIQUE:  Low dose axial images, sagittal and coronal reformations were obtained from the thoracic inlet to the lung bases following the IV administration of 100 mL of Omnipaque 350.  Contrast timing was optimized to evaluate the pulmonary arteries.  MIP images were performed.    COMPARISON:  03/24/2021    FINDINGS:  There is good pulmonary artery enhancement.  There is some image degradation  from motion limiting evaluation primarily for small peripheral intraluminal filling defects.  As visualized no intraluminal filling defects are seen suggesting pulmonary artery embolism.    The visualized structures at the base of the neck are unremarkable appearance.  There is no significant hilar or mediastinal adenopathy.  There is no pleural or pericardial effusion.  Visualized upper abdominal structures show cholecystectomy clips    Lung; slight plate like atelectasis the lower lung fields more so the right.  Right hemidiaphragm mildly asymmetrically elevated and overall there is high position of the diaphragms with less than optimal degree of inspiration.  Appearance is similar to the prior exam                                       Medications   iohexoL (OMNIPAQUE 350) injection 100 mL (100 mLs Intravenous Given 8/16/24 1506)     Medical Decision Making  39-year-old female presents to be evaluated for reproducible right-sided chest wall pain worse with motion that began yesterday   Differential diagnoses include MSK pain, costochondritis,pleuritic pain chest pain, ACS, PE    Amount and/or Complexity of Data Reviewed  Labs: ordered.  Radiology: ordered.    Risk  Risk Details: Patient unremarkable physical exam in ED today she does have right-sided reproducible chest wall pain   No leukocytosis or gross electrolyte derangement   Normal cardiac exam   Mildly elevated D-dimer CTA done ruled out PE   Patient is stable for DC for treatment of costochondritis instructed to follow-up with PCP                                      Clinical Impression:  Final diagnoses:  [R07.89] Chest wall pain  [M94.0] Costochondritis (Primary)          ED Disposition Condition    Discharge Stable          ED Prescriptions       Medication Sig Dispense Start Date End Date Auth. Provider    ketorolac (TORADOL) 10 mg tablet Take 1 tablet (10 mg total) by mouth every 6 (six) hours. for 5 days 20 tablet 8/16/2024 8/21/2024 Srikanth  Paty WINSTON NP    predniSONE (DELTASONE) 20 MG tablet Take 2 tablets (40 mg total) by mouth once daily. for 5 days 10 tablet 8/16/2024 8/21/2024 Paty Duke NP          Follow-up Information    None          Paty Duke NP  08/16/24 0546

## 2024-08-19 ENCOUNTER — PATIENT OUTREACH (OUTPATIENT)
Dept: ADMINISTRATIVE | Facility: HOSPITAL | Age: 39
End: 2024-08-19
Payer: MEDICAID

## 2024-08-19 LAB
OHS QRS DURATION: 82 MS
OHS QTC CALCULATION: 479 MS

## 2024-08-20 ENCOUNTER — OFFICE VISIT (OUTPATIENT)
Dept: FAMILY MEDICINE | Facility: CLINIC | Age: 39
End: 2024-08-20
Payer: MEDICAID

## 2024-08-20 ENCOUNTER — CLINICAL SUPPORT (OUTPATIENT)
Dept: FAMILY MEDICINE | Facility: CLINIC | Age: 39
End: 2024-08-20
Payer: MEDICAID

## 2024-08-20 VITALS
HEART RATE: 95 BPM | WEIGHT: 232.13 LBS | RESPIRATION RATE: 16 BRPM | OXYGEN SATURATION: 97 % | SYSTOLIC BLOOD PRESSURE: 104 MMHG | HEIGHT: 62 IN | DIASTOLIC BLOOD PRESSURE: 62 MMHG | BODY MASS INDEX: 42.72 KG/M2

## 2024-08-20 DIAGNOSIS — E11.29 TYPE 2 DIABETES MELLITUS WITH DIABETIC MICROALBUMINURIA, WITHOUT LONG-TERM CURRENT USE OF INSULIN: ICD-10-CM

## 2024-08-20 DIAGNOSIS — E78.2 MIXED HYPERLIPIDEMIA: ICD-10-CM

## 2024-08-20 DIAGNOSIS — B00.1 COLD SORE: ICD-10-CM

## 2024-08-20 DIAGNOSIS — M54.50 CHRONIC MIDLINE LOW BACK PAIN, UNSPECIFIED WHETHER SCIATICA PRESENT: ICD-10-CM

## 2024-08-20 DIAGNOSIS — G89.29 CHRONIC MIDLINE LOW BACK PAIN, UNSPECIFIED WHETHER SCIATICA PRESENT: ICD-10-CM

## 2024-08-20 DIAGNOSIS — E55.9 VITAMIN D DEFICIENCY: ICD-10-CM

## 2024-08-20 DIAGNOSIS — E66.01 CLASS 3 SEVERE OBESITY WITH SERIOUS COMORBIDITY AND BODY MASS INDEX (BMI) OF 40.0 TO 44.9 IN ADULT, UNSPECIFIED OBESITY TYPE: ICD-10-CM

## 2024-08-20 DIAGNOSIS — R80.9 TYPE 2 DIABETES MELLITUS WITH DIABETIC MICROALBUMINURIA, WITHOUT LONG-TERM CURRENT USE OF INSULIN: ICD-10-CM

## 2024-08-20 DIAGNOSIS — R80.9 TYPE 2 DIABETES MELLITUS WITH DIABETIC MICROALBUMINURIA, WITHOUT LONG-TERM CURRENT USE OF INSULIN: Primary | ICD-10-CM

## 2024-08-20 DIAGNOSIS — K59.09 OTHER CONSTIPATION: ICD-10-CM

## 2024-08-20 DIAGNOSIS — E11.29 TYPE 2 DIABETES MELLITUS WITH DIABETIC MICROALBUMINURIA, WITHOUT LONG-TERM CURRENT USE OF INSULIN: Primary | ICD-10-CM

## 2024-08-20 DIAGNOSIS — I10 ESSENTIAL HYPERTENSION: ICD-10-CM

## 2024-08-20 DIAGNOSIS — D50.0 IRON DEFICIENCY ANEMIA DUE TO CHRONIC BLOOD LOSS: ICD-10-CM

## 2024-08-20 PROBLEM — M85.051: Status: ACTIVE | Noted: 2020-01-23

## 2024-08-20 PROBLEM — E66.813 CLASS 3 SEVERE OBESITY WITH SERIOUS COMORBIDITY AND BODY MASS INDEX (BMI) OF 40.0 TO 44.9 IN ADULT: Status: ACTIVE | Noted: 2024-08-20

## 2024-08-20 PROCEDURE — 36415 COLL VENOUS BLD VENIPUNCTURE: CPT | Mod: PBBFAC

## 2024-08-20 PROCEDURE — 3044F HG A1C LEVEL LT 7.0%: CPT | Mod: CPTII,,, | Performed by: STUDENT IN AN ORGANIZED HEALTH CARE EDUCATION/TRAINING PROGRAM

## 2024-08-20 PROCEDURE — 82043 UR ALBUMIN QUANTITATIVE: CPT | Performed by: STUDENT IN AN ORGANIZED HEALTH CARE EDUCATION/TRAINING PROGRAM

## 2024-08-20 PROCEDURE — 3008F BODY MASS INDEX DOCD: CPT | Mod: CPTII,,, | Performed by: STUDENT IN AN ORGANIZED HEALTH CARE EDUCATION/TRAINING PROGRAM

## 2024-08-20 PROCEDURE — 82728 ASSAY OF FERRITIN: CPT | Performed by: STUDENT IN AN ORGANIZED HEALTH CARE EDUCATION/TRAINING PROGRAM

## 2024-08-20 PROCEDURE — 1159F MED LIST DOCD IN RCRD: CPT | Mod: CPTII,,, | Performed by: STUDENT IN AN ORGANIZED HEALTH CARE EDUCATION/TRAINING PROGRAM

## 2024-08-20 PROCEDURE — 2023F DILAT RTA XM W/O RTNOPTHY: CPT | Mod: CPTII,,, | Performed by: STUDENT IN AN ORGANIZED HEALTH CARE EDUCATION/TRAINING PROGRAM

## 2024-08-20 PROCEDURE — 84443 ASSAY THYROID STIM HORMONE: CPT | Performed by: STUDENT IN AN ORGANIZED HEALTH CARE EDUCATION/TRAINING PROGRAM

## 2024-08-20 PROCEDURE — 99214 OFFICE O/P EST MOD 30 MIN: CPT | Mod: PBBFAC | Performed by: STUDENT IN AN ORGANIZED HEALTH CARE EDUCATION/TRAINING PROGRAM

## 2024-08-20 PROCEDURE — 82570 ASSAY OF URINE CREATININE: CPT | Performed by: STUDENT IN AN ORGANIZED HEALTH CARE EDUCATION/TRAINING PROGRAM

## 2024-08-20 PROCEDURE — 84439 ASSAY OF FREE THYROXINE: CPT | Performed by: STUDENT IN AN ORGANIZED HEALTH CARE EDUCATION/TRAINING PROGRAM

## 2024-08-20 PROCEDURE — 3074F SYST BP LT 130 MM HG: CPT | Mod: CPTII,,, | Performed by: STUDENT IN AN ORGANIZED HEALTH CARE EDUCATION/TRAINING PROGRAM

## 2024-08-20 PROCEDURE — 3078F DIAST BP <80 MM HG: CPT | Mod: CPTII,,, | Performed by: STUDENT IN AN ORGANIZED HEALTH CARE EDUCATION/TRAINING PROGRAM

## 2024-08-20 PROCEDURE — 99214 OFFICE O/P EST MOD 30 MIN: CPT | Mod: S$PBB,,, | Performed by: STUDENT IN AN ORGANIZED HEALTH CARE EDUCATION/TRAINING PROGRAM

## 2024-08-20 PROCEDURE — 83036 HEMOGLOBIN GLYCOSYLATED A1C: CPT | Performed by: STUDENT IN AN ORGANIZED HEALTH CARE EDUCATION/TRAINING PROGRAM

## 2024-08-20 PROCEDURE — 99999 PR PBB SHADOW E&M-EST. PATIENT-LVL IV: CPT | Mod: PBBFAC,,, | Performed by: STUDENT IN AN ORGANIZED HEALTH CARE EDUCATION/TRAINING PROGRAM

## 2024-08-20 PROCEDURE — G2211 COMPLEX E/M VISIT ADD ON: HCPCS | Mod: S$PBB,,, | Performed by: STUDENT IN AN ORGANIZED HEALTH CARE EDUCATION/TRAINING PROGRAM

## 2024-08-20 PROCEDURE — 99999PBSHW PR PBB SHADOW TECHNICAL ONLY FILED TO HB: Mod: PBBFAC,,,

## 2024-08-20 PROCEDURE — 83540 ASSAY OF IRON: CPT | Performed by: STUDENT IN AN ORGANIZED HEALTH CARE EDUCATION/TRAINING PROGRAM

## 2024-08-20 RX ORDER — SEMAGLUTIDE 1.34 MG/ML
1 INJECTION, SOLUTION SUBCUTANEOUS
Qty: 3 ML | Refills: 5 | Status: SHIPPED | OUTPATIENT
Start: 2024-08-20 | End: 2025-02-04

## 2024-08-20 RX ORDER — GABAPENTIN 300 MG/1
300 CAPSULE ORAL 2 TIMES DAILY
Qty: 60 CAPSULE | Refills: 5 | Status: SHIPPED | OUTPATIENT
Start: 2024-08-20

## 2024-08-20 RX ORDER — DOCOSANOL 100 MG/G
1 CREAM TOPICAL
Qty: 2 G | Refills: 0 | Status: SHIPPED | OUTPATIENT
Start: 2024-08-20

## 2024-08-20 NOTE — PROGRESS NOTES
Subjective:       Patient ID: Ethel Glass is a 39 y.o. female.    Chief Complaint: Follow-up (Patient is here today for a follow up visit. )    Pt here for follow-up    YURI: She has received IV iron in the past. She also had mild thombocytosis on labs. She is followed by Dr. Wade at [facility], hem/onc.    Constipation: she has been doing well. She uses linzess.     DM2.obesity: she is currently on ozempic 0.5mg weekly and metformin 500mg BID. Last A1c 6.1. she did gain 9lb since her last visit.    Vit D def: currently taking a vit D supplement.    HLD: tolerating statin.    GERD: she was on protonix and has been for years. Followed by GI.    HTN: doing well with spironolactone.    She is still followed by mental health. Meds include alprazolam, lexapro, and seroquel     Review of Systems   Constitutional:  Negative for chills and fever.   HENT:  Negative for congestion and sore throat.    Respiratory:  Negative for cough and shortness of breath.    Cardiovascular:  Negative for chest pain.   Gastrointestinal:  Negative for abdominal pain, constipation, diarrhea and vomiting.   Genitourinary:  Negative for dysuria and hematuria.   Musculoskeletal:  Positive for arthralgias.   Neurological:  Negative for dizziness, syncope and light-headedness.       Objective:      Physical Exam  Vitals reviewed.   Constitutional:       General: She is not in acute distress.     Appearance: She is obese.   HENT:      Head: Normocephalic and atraumatic.   Eyes:      Conjunctiva/sclera: Conjunctivae normal.   Cardiovascular:      Rate and Rhythm: Normal rate and regular rhythm.      Heart sounds: Normal heart sounds. No murmur heard.  Pulmonary:      Effort: Pulmonary effort is normal. No respiratory distress.      Breath sounds: Normal breath sounds.   Neurological:      General: No focal deficit present.      Mental Status: She is alert and oriented to person, place, and time.      Comments:      Psychiatric:         Mood and  Affect: Mood normal.         Behavior: Behavior normal.         Assessment:       1. Type 2 diabetes mellitus with diabetic microalbuminuria, without long-term current use of insulin    2. Iron deficiency anemia due to chronic blood loss    3. Vitamin D deficiency    4. Essential hypertension    5. Mixed hyperlipidemia    6. Class 3 severe obesity with serious comorbidity and body mass index (BMI) of 40.0 to 44.9 in adult, unspecified obesity type              Plan:           1. Type 2 diabetes mellitus with diabetic microalbuminuria, without long-term current use of insulin  -     Hemoglobin A1C; Future; Expected date: 08/20/2024  -     semaglutide (OZEMPIC) 1 mg/dose (4 mg/3 mL); Inject 1 mg into the skin every 7 days.  Dispense: 3 mL; Refill: 5  -     Microalbumin/Creatinine Ratio, Urine; Future; Expected date: 08/20/2024    2. Iron deficiency anemia due to chronic blood loss  -     Iron and TIBC; Future; Expected date: 11/18/2024  -     Ferritin; Future; Expected date: 11/18/2024    3. Vitamin D deficiency  -     TSH; Future; Expected date: 08/20/2024    4. Essential hypertension  -     TSH; Future; Expected date: 08/20/2024  -     Microalbumin/Creatinine Ratio, Urine; Future; Expected date: 08/20/2024    5. Mixed hyperlipidemia  -     TSH; Future; Expected date: 08/20/2024    6. Class 3 severe obesity with serious comorbidity and body mass index (BMI) of 40.0 to 44.9 in adult, unspecified obesity type  -     semaglutide (OZEMPIC) 1 mg/dose (4 mg/3 mL); Inject 1 mg into the skin every 7 days.  Dispense: 3 mL; Refill: 5  -     TSH; Future; Expected date: 08/20/2024      Labs as ordered  Increase ozempic to 1mg weekly  Cont other meds  Follow-up psych as scheduled  Follow-up Dr. Wade, hem/onc, as scheduled   RTC 3 months or sooner if needed

## 2024-08-21 ENCOUNTER — PATIENT MESSAGE (OUTPATIENT)
Dept: FAMILY MEDICINE | Facility: CLINIC | Age: 39
End: 2024-08-21
Payer: MEDICAID

## 2024-08-21 DIAGNOSIS — E03.9 HYPOTHYROIDISM, UNSPECIFIED TYPE: Primary | ICD-10-CM

## 2024-08-21 LAB
ALBUMIN/CREAT UR: 19.6 UG/MG (ref 0–30)
CREAT UR-MCNC: 250.5 MG/DL (ref 15–325)
ESTIMATED AVG GLUCOSE: 120 MG/DL (ref 68–131)
FERRITIN SERPL-MCNC: 213 NG/ML (ref 20–300)
HBA1C MFR BLD: 5.8 % (ref 4–5.6)
IRON SERPL-MCNC: 33 UG/DL (ref 30–160)
MICROALBUMIN UR DL<=1MG/L-MCNC: 49 UG/ML
SATURATED IRON: 8 % (ref 20–50)
T4 FREE SERPL-MCNC: 0.69 NG/DL (ref 0.71–1.51)
TOTAL IRON BINDING CAPACITY: 394 UG/DL (ref 250–450)
TRANSFERRIN SERPL-MCNC: 266 MG/DL (ref 200–375)
TSH SERPL DL<=0.005 MIU/L-ACNC: 7.07 UIU/ML (ref 0.4–4)

## 2024-08-21 RX ORDER — LEVOTHYROXINE SODIUM 25 UG/1
25 TABLET ORAL
Qty: 30 TABLET | Refills: 2 | Status: SHIPPED | OUTPATIENT
Start: 2024-08-21 | End: 2024-11-19

## 2024-08-21 NOTE — PROGRESS NOTES
Please inform the pt recent labs show she has hypothyroidism and I am sending a medication to start. She needs to repeat labs in 4-6 weeks.

## 2024-09-20 ENCOUNTER — HOSPITAL ENCOUNTER (EMERGENCY)
Facility: HOSPITAL | Age: 39
Discharge: HOME | End: 2024-09-20
Attending: SURGERY
Payer: MEDICAID

## 2024-09-20 VITALS
WEIGHT: 238.19 LBS | HEART RATE: 92 BPM | OXYGEN SATURATION: 97 % | TEMPERATURE: 99 F | DIASTOLIC BLOOD PRESSURE: 67 MMHG | RESPIRATION RATE: 18 BRPM | BODY MASS INDEX: 43.57 KG/M2 | SYSTOLIC BLOOD PRESSURE: 117 MMHG

## 2024-09-20 DIAGNOSIS — K04.7 DENTAL ABSCESS: Primary | ICD-10-CM

## 2024-09-20 PROCEDURE — 63600175 PHARM REV CODE 636 W HCPCS: Performed by: SURGERY

## 2024-09-20 PROCEDURE — 99284 EMERGENCY DEPT VISIT MOD MDM: CPT | Mod: 25

## 2024-09-20 PROCEDURE — 96372 THER/PROPH/DIAG INJ SC/IM: CPT | Performed by: SURGERY

## 2024-09-20 PROCEDURE — 25000003 PHARM REV CODE 250: Performed by: SURGERY

## 2024-09-20 RX ORDER — HYDROCODONE BITARTRATE AND ACETAMINOPHEN 5; 325 MG/1; MG/1
1 TABLET ORAL
Status: COMPLETED | OUTPATIENT
Start: 2024-09-20 | End: 2024-09-20

## 2024-09-20 RX ORDER — CEFTRIAXONE 1 G/1
1 INJECTION, POWDER, FOR SOLUTION INTRAMUSCULAR; INTRAVENOUS
Status: COMPLETED | OUTPATIENT
Start: 2024-09-20 | End: 2024-09-20

## 2024-09-20 RX ORDER — HYDROCODONE BITARTRATE AND ACETAMINOPHEN 5; 325 MG/1; MG/1
1 TABLET ORAL EVERY 8 HOURS PRN
Qty: 12 TABLET | Refills: 0 | Status: SHIPPED | OUTPATIENT
Start: 2024-09-20 | End: 2024-09-24

## 2024-09-20 RX ORDER — DIPHENHYDRAMINE HCL 25 MG
25 CAPSULE ORAL
Status: COMPLETED | OUTPATIENT
Start: 2024-09-20 | End: 2024-09-20

## 2024-09-20 RX ORDER — CEPHALEXIN 500 MG/1
500 CAPSULE ORAL EVERY 8 HOURS
Qty: 21 CAPSULE | Refills: 0 | Status: SHIPPED | OUTPATIENT
Start: 2024-09-20 | End: 2024-09-27

## 2024-09-20 RX ADMIN — HYDROCODONE BITARTRATE AND ACETAMINOPHEN 1 TABLET: 5; 325 TABLET ORAL at 06:09

## 2024-09-20 RX ADMIN — DIPHENHYDRAMINE HYDROCHLORIDE 25 MG: 25 CAPSULE ORAL at 06:09

## 2024-09-20 RX ADMIN — CEFTRIAXONE SODIUM 1 G: 1 INJECTION, POWDER, FOR SOLUTION INTRAMUSCULAR; INTRAVENOUS at 06:09

## 2024-09-20 NOTE — ED PROVIDER NOTES
Encounter Date: 2024       History     Chief Complaint   Patient presents with    Dental Pain     Patient has had a sore left maxilla with swelling and a toothache for several days she went to urgent care yesterday and diagnosed with a dental abscess and given clindamycin and states that she could not tolerate all the clindamycin she was taken had upset stomach    The history is provided by the patient.   Dental Pain  The primary symptoms include mouth pain. The symptoms began two days ago. The symptoms are worsening. The symptoms are new. The symptoms occur constantly.   Mouth pain occurs constantly. Affected locations include: teeth. At its highest the mouth pain was at 10/10. The mouth pain is currently at 9/10.     Review of patient's allergies indicates:   Allergen Reactions    Latex, natural rubber Hives    Pcn [penicillins] Hives     Past Medical History:   Diagnosis Date    Bipolar 1 disorder     COPD (chronic obstructive pulmonary disease)     Diabetes mellitus     Diabetes mellitus, type 2     Fibrous dysplasia (monostotic), other site     right hip    GERD (gastroesophageal reflux disease)     Post traumatic stress disorder (PTSD)      Past Surgical History:   Procedure Laterality Date     SECTION      CHOLECYSTECTOMY      HIP SURGERY      Wrist Left       Family History   Problem Relation Name Age of Onset    Hypertension Mother Franca Glass     Diabetes Mother Franca Glass     Arthritis Mother Franca Glass     Mental illness Mother Franca Glass     Stroke Mother Franca Glass     COPD Mother Franca Barbosaley     Depression Mother Franca Glass     Asthma Father Mauro Clemente diseased     Diabetes Father Amuro Clemente diseased     Hypertension Father Mauro Clemente diseased     Cancer Father Mauro Clemente diseased     Arthritis Father Mauro Clemente diseased     Intellectual disability Sister Rossy Glass     Hypertension Sister Rossy Glass     Early death Sister  Rossy Howard     Learning disabilities Sister Rossy Howard     Asthma Sister Rossy Howard     Hypertension Brother Raad howard     Learning disabilities Brother Raad howard     Learning disabilities Son Adbiaziz tay      Learning disabilities Son Clem Howard      Social History     Tobacco Use    Smoking status: Never    Smokeless tobacco: Never   Substance Use Topics    Alcohol use: No    Drug use: No     Review of Systems   Constitutional: Negative.    HENT:  Positive for dental problem.    Eyes: Negative.    Respiratory: Negative.     Cardiovascular: Negative.    Gastrointestinal: Negative.    Endocrine: Negative.    Genitourinary: Negative.    Musculoskeletal: Negative.    Skin: Negative.    Allergic/Immunologic: Negative.    Neurological: Negative.    Hematological: Negative.    Psychiatric/Behavioral: Negative.         Physical Exam     Initial Vitals [09/20/24 0622]   BP Pulse Resp Temp SpO2   117/67 92 16 99 °F (37.2 °C) 97 %      MAP       --         Physical Exam    Nursing note and vitals reviewed.  Constitutional: She appears well-developed and well-nourished.   HENT:   Head: Normocephalic.   Neck: Neck supple. No tracheal deviation present.   Normal range of motion.  Cardiovascular:  Normal rate.           Pulmonary/Chest: Breath sounds normal.   Abdominal: Abdomen is soft.   Musculoskeletal:      Cervical back: Normal range of motion and neck supple.     Neurological: She is alert and oriented to person, place, and time. GCS score is 15. GCS eye subscore is 4. GCS verbal subscore is 5. GCS motor subscore is 6.   Skin: Skin is warm and dry.   Psychiatric: She has a normal mood and affect.         ED Course   Procedures  Labs Reviewed - No data to display       Imaging Results    None          Medications   cefTRIAXone injection 1 g (1 g Intramuscular Given 9/20/24 0654)   diphenhydrAMINE capsule 25 mg (25 mg Oral Given 9/20/24 0654)   HYDROcodone-acetaminophen 5-325 mg per tablet 1  tablet (1 tablet Oral Given 9/20/24 0654)     Medical Decision Making  Patient seen in triage area she has a left maxillary abscess secondary to a tooth decay the 1st premolar left maxilla will treat with systemic Rocephin and Rocephin and Keflex and follow up with primary dentist    Risk  OTC drugs.  Prescription drug management.                                      Clinical Impression:  Final diagnoses:  [K04.7] Dental abscess (Primary)          ED Disposition Condition    Discharge Stable          ED Prescriptions       Medication Sig Dispense Start Date End Date Auth. Provider    cephALEXin (KEFLEX) 500 MG capsule Take 1 capsule (500 mg total) by mouth every 8 (eight) hours. for 7 days 21 capsule 9/20/2024 9/27/2024 MURTAZA Joya III, MD    HYDROcodone-acetaminophen (NORCO) 5-325 mg per tablet Take 1 tablet by mouth every 8 (eight) hours as needed. 12 tablet 9/20/2024 9/24/2024 MURTAZA Joya III, MD          Follow-up Information    None          MURTAZA Joya III, MD  09/20/24 0682

## 2024-09-20 NOTE — ED TRIAGE NOTES
Patient to ER CC of dental pain went to urgent care yesterday and was placed on antibiotics states the meds are making her feel bad and the swelling is still the same from yesterday

## 2024-10-07 ENCOUNTER — TELEPHONE (OUTPATIENT)
Dept: FAMILY MEDICINE | Facility: CLINIC | Age: 39
End: 2024-10-07
Payer: MEDICAID

## 2024-10-07 NOTE — TELEPHONE ENCOUNTER
----- Message from Niles sent at 10/7/2024  1:18 PM CDT -----  Contact: pt  Ethel Glass  MRN: [id number]  : 1985  PCP: Raad Orta  Home Phone      [phone]  Work Phone      Not on file.  Mobile          [phone]  Mobile          [phone]  Home Phone      [phone]  Home Phone      Not on file.      MESSAGE:     Pt called to schedule appt for thyroid issues. PAR didn't have anything until . Pt would like to be seen sooner.        Please advise  [phone]

## 2024-10-11 ENCOUNTER — CLINICAL SUPPORT (OUTPATIENT)
Dept: FAMILY MEDICINE | Facility: CLINIC | Age: 39
End: 2024-10-11
Payer: MEDICAID

## 2024-10-11 DIAGNOSIS — E03.9 HYPOTHYROIDISM, UNSPECIFIED TYPE: ICD-10-CM

## 2024-10-11 DIAGNOSIS — E11.9 TYPE 2 DIABETES MELLITUS WITHOUT COMPLICATION, WITHOUT LONG-TERM CURRENT USE OF INSULIN: ICD-10-CM

## 2024-10-11 LAB
ALBUMIN/CREAT UR: 237.1 UG/MG (ref 0–30)
CREAT UR-MCNC: 116.4 MG/DL (ref 15–325)
ESTIMATED AVG GLUCOSE: 114 MG/DL (ref 68–131)
HBA1C MFR BLD: 5.6 % (ref 4–5.6)
MICROALBUMIN UR DL<=1MG/L-MCNC: 276 UG/ML
THYROPEROXIDASE IGG SERPL-ACNC: <6 IU/ML
TSH SERPL DL<=0.005 MIU/L-ACNC: 2.49 UIU/ML (ref 0.4–4)

## 2024-10-11 PROCEDURE — 83036 HEMOGLOBIN GLYCOSYLATED A1C: CPT | Performed by: STUDENT IN AN ORGANIZED HEALTH CARE EDUCATION/TRAINING PROGRAM

## 2024-10-11 PROCEDURE — 84443 ASSAY THYROID STIM HORMONE: CPT | Performed by: STUDENT IN AN ORGANIZED HEALTH CARE EDUCATION/TRAINING PROGRAM

## 2024-10-11 PROCEDURE — 86376 MICROSOMAL ANTIBODY EACH: CPT | Performed by: STUDENT IN AN ORGANIZED HEALTH CARE EDUCATION/TRAINING PROGRAM

## 2024-10-11 PROCEDURE — 82570 ASSAY OF URINE CREATININE: CPT | Performed by: STUDENT IN AN ORGANIZED HEALTH CARE EDUCATION/TRAINING PROGRAM

## 2024-10-15 ENCOUNTER — TELEPHONE (OUTPATIENT)
Dept: FAMILY MEDICINE | Facility: CLINIC | Age: 39
End: 2024-10-15
Payer: MEDICAID

## 2024-10-15 NOTE — LETTER
"October 15, 2024    Ethel Glass  [address]  [place] [address]             [facility]  [address]  [place] [address]  Phone: [phone]  Fax: [phone] Dear Ms. Ethel Barbosaley:    We are sorry that you missed your appointment with  on 10/15/2024, 07/12/2024, 06/25/2024, & 01/12/2024. Your health and follow-up medical care are important to us. Further "no-shows" to appointments may result in dismissal from the provider and/or clinic  Sincerely,        Katharine Figueroa MA      "

## 2024-10-18 ENCOUNTER — TELEPHONE (OUTPATIENT)
Dept: FAMILY MEDICINE | Facility: CLINIC | Age: 39
End: 2024-10-18
Payer: MEDICAID

## 2024-10-18 DIAGNOSIS — D50.0 IRON DEFICIENCY ANEMIA DUE TO CHRONIC BLOOD LOSS: Primary | ICD-10-CM

## 2024-10-18 NOTE — TELEPHONE ENCOUNTER
----- Message from Niles sent at 10/18/2024  1:01 PM CDT -----  Contact: pt  Ethel Glass  MRN: [id number]  : 1985  PCP: Raad Orta  Home Phone      [phone]  Work Phone      Not on file.  Mobile          [phone]  Mobile          [phone]  Home Phone      [phone]  Home Phone      Not on file.      MESSAGE:     Pt called wanting to speak with nurse about adding some more lab orders to her chart.          Please advise  [phone]

## 2024-10-25 DIAGNOSIS — E03.9 HYPOTHYROIDISM, UNSPECIFIED TYPE: ICD-10-CM

## 2024-10-25 RX ORDER — LEVOTHYROXINE SODIUM 25 UG/1
25 TABLET ORAL
Qty: 30 TABLET | Refills: 2 | Status: SHIPPED | OUTPATIENT
Start: 2024-10-25

## 2024-10-25 NOTE — TELEPHONE ENCOUNTER
Care Due:                  Date            Visit Type   Department     Provider  --------------------------------------------------------------------------------                                MYCHART                              ANNUAL                              CHECKUP/PHY  [facility]  Last Visit: 08-      S            MEDICINE       Raad Orta                              EP -                              [facility]  Next Visit: 12-      CARE ([facility])   MEDICINE       Raad Orta                                                            Last  Test          Frequency    Reason                     Performed    Due Date  --------------------------------------------------------------------------------    Lipid Panel.  12 months..  rosuvastatin.............  10-   10-    [employer] Embedded Care Due Messages. Reference number: [id number].   10/25/2024 5:13:51 AM CDT

## 2024-10-28 DIAGNOSIS — G89.29 CHRONIC MIDLINE LOW BACK PAIN, UNSPECIFIED WHETHER SCIATICA PRESENT: ICD-10-CM

## 2024-10-28 DIAGNOSIS — M54.50 CHRONIC MIDLINE LOW BACK PAIN, UNSPECIFIED WHETHER SCIATICA PRESENT: ICD-10-CM

## 2024-10-29 RX ORDER — CYCLOBENZAPRINE HCL 10 MG
10 TABLET ORAL NIGHTLY
Qty: 15 TABLET | Refills: 2 | Status: SHIPPED | OUTPATIENT
Start: 2024-10-29

## 2024-10-29 RX ORDER — COVID-19 ANTIGEN TEST
1 KIT MISCELLANEOUS
Qty: 2 EACH | Refills: 5 | Status: SHIPPED | OUTPATIENT
Start: 2024-10-29

## 2024-11-25 DIAGNOSIS — E11.9 TYPE 2 DIABETES MELLITUS WITHOUT COMPLICATION, WITHOUT LONG-TERM CURRENT USE OF INSULIN: ICD-10-CM

## 2024-11-25 RX ORDER — METFORMIN HYDROCHLORIDE 500 MG/1
500 TABLET ORAL 2 TIMES DAILY
Qty: 180 TABLET | Refills: 1 | Status: SHIPPED | OUTPATIENT
Start: 2024-11-25

## 2024-11-25 NOTE — TELEPHONE ENCOUNTER
No care due was identified.  [facility] Embedded Care Due Messages. Reference number: [id number].   11/25/2024 1:17:03 PM CST

## 2024-11-29 ENCOUNTER — PATIENT MESSAGE (OUTPATIENT)
Dept: ADMINISTRATIVE | Facility: OTHER | Age: 39
End: 2024-11-29
Payer: MEDICAID

## 2024-12-19 ENCOUNTER — TELEPHONE (OUTPATIENT)
Dept: FAMILY MEDICINE | Facility: CLINIC | Age: 39
End: 2024-12-19
Payer: MEDICAID

## 2024-12-26 ENCOUNTER — PATIENT MESSAGE (OUTPATIENT)
Dept: OBSTETRICS AND GYNECOLOGY | Facility: CLINIC | Age: 39
End: 2024-12-26
Payer: MEDICAID

## 2025-01-21 DIAGNOSIS — E03.9 HYPOTHYROIDISM, UNSPECIFIED TYPE: ICD-10-CM

## 2025-01-21 DIAGNOSIS — G89.29 CHRONIC MIDLINE LOW BACK PAIN, UNSPECIFIED WHETHER SCIATICA PRESENT: ICD-10-CM

## 2025-01-21 DIAGNOSIS — M54.50 CHRONIC MIDLINE LOW BACK PAIN, UNSPECIFIED WHETHER SCIATICA PRESENT: ICD-10-CM

## 2025-01-21 NOTE — TELEPHONE ENCOUNTER
Care Due:                  Date            Visit Type   Department     Provider  --------------------------------------------------------------------------------                                MYCHART                              ANNUAL                              CHECKUP/PHY  [facility]  Last Visit: 08-      S            MEDICINE       Raad Orta                              EP -                              [facility]  Next Visit: 02-      [facility] ([facility])   MEDICINE       Raad Orta                                                            Last  Test          Frequency    Reason                     Performed    Due Date  --------------------------------------------------------------------------------    HBA1C.......  6 months...  metFORMIN, semaglutide...  10-   04-    Lipid Panel.  12 months..  rosuvastatin.............  10-   10-    Vitamin D...  12 months..  cholecalciferol,.........  03- 02-    [facility] Embedded Care Due Messages. Reference number: [id number].   1/21/2025 5:15:47 AM CST

## 2025-01-21 NOTE — TELEPHONE ENCOUNTER
No care due was identified.  [facility] Embedded Care Due Messages. Reference number: [id number].   1/21/2025 5:16:17 AM CST

## 2025-01-24 RX ORDER — GABAPENTIN 300 MG/1
300 CAPSULE ORAL 2 TIMES DAILY
Qty: 60 CAPSULE | Refills: 1 | Status: SHIPPED | OUTPATIENT
Start: 2025-01-24

## 2025-01-24 RX ORDER — LEVOTHYROXINE SODIUM 25 UG/1
25 TABLET ORAL
Qty: 30 TABLET | Refills: 2 | Status: SHIPPED | OUTPATIENT
Start: 2025-01-24

## 2025-03-19 ENCOUNTER — TELEPHONE (OUTPATIENT)
Dept: FAMILY MEDICINE | Facility: CLINIC | Age: 40
End: 2025-03-19
Payer: MEDICAID

## 2025-03-19 NOTE — TELEPHONE ENCOUNTER
doesn't have appt until June with Dr Orta -- in need of meds - wants to know if she can come for labs (diabetes & A1C) -- please advise

## 2025-03-19 NOTE — TELEPHONE ENCOUNTER
----- Message from Fawad sent at 3/19/2025  1:12 PM CDT -----  Contact: Patient  Ethel GlassMRN: [id number]: 1985PCP: Raad Orta.Home Phone      [phone] Phone      Not on file.Mobile          [phone]          [phone] Phone      [phone] Phone      Not on file.MESSAGE: doesn't have appt until June with Dr Orta -- in need of meds - wants to know if she can come for labs (diabetes & A1C) -- please adviseCall [phone]: You

## 2025-03-27 DIAGNOSIS — M54.50 CHRONIC MIDLINE LOW BACK PAIN, UNSPECIFIED WHETHER SCIATICA PRESENT: ICD-10-CM

## 2025-03-27 DIAGNOSIS — I10 ESSENTIAL HYPERTENSION: ICD-10-CM

## 2025-03-27 DIAGNOSIS — E11.9 TYPE 2 DIABETES MELLITUS WITHOUT COMPLICATION, WITHOUT LONG-TERM CURRENT USE OF INSULIN: ICD-10-CM

## 2025-03-27 DIAGNOSIS — G89.29 CHRONIC MIDLINE LOW BACK PAIN, UNSPECIFIED WHETHER SCIATICA PRESENT: ICD-10-CM

## 2025-03-27 NOTE — TELEPHONE ENCOUNTER
LOV 08/20/2024    patient requesting refill for   metFORMIN (GLUCOPHAGE) 500 MG tablet       RX pending   pharmacy confirmed  please advise

## 2025-03-27 NOTE — TELEPHONE ENCOUNTER
No care due was identified.  [facility] Embedded Care Due Messages. Reference number: [id number].   3/27/2025 5:54:47 AM CDT

## 2025-03-27 NOTE — TELEPHONE ENCOUNTER
No care due was identified.  [facility] Embedded Care Due Messages. Reference number: [id number].   3/27/2025 5:55:37 AM CDT

## 2025-03-28 RX ORDER — METFORMIN HYDROCHLORIDE 500 MG/1
500 TABLET ORAL 2 TIMES DAILY
Qty: 60 TABLET | Refills: 0 | Status: SHIPPED | OUTPATIENT
Start: 2025-03-28

## 2025-03-28 RX ORDER — SPIRONOLACTONE 100 MG/1
100 TABLET, FILM COATED ORAL DAILY
Qty: 30 TABLET | Refills: 0 | Status: SHIPPED | OUTPATIENT
Start: 2025-03-28

## 2025-03-28 RX ORDER — GABAPENTIN 300 MG/1
300 CAPSULE ORAL 2 TIMES DAILY
Qty: 60 CAPSULE | Refills: 0 | Status: SHIPPED | OUTPATIENT
Start: 2025-03-28

## 2025-04-07 NOTE — TELEPHONE ENCOUNTER
No care due was identified.  [facility] Embedded Care Due Messages. Reference number: [id number].   4/07/2025 1:02:13 AM CDT

## 2025-04-08 RX ORDER — ALBUTEROL SULFATE 0.83 MG/ML
2.5 SOLUTION RESPIRATORY (INHALATION) EVERY 6 HOURS PRN
Qty: 75 ML | Refills: 0 | Status: SHIPPED | OUTPATIENT
Start: 2025-04-08

## 2025-04-17 ENCOUNTER — OFFICE VISIT (OUTPATIENT)
Dept: FAMILY MEDICINE | Facility: CLINIC | Age: 40
End: 2025-04-17
Payer: MEDICAID

## 2025-04-17 ENCOUNTER — CLINICAL SUPPORT (OUTPATIENT)
Dept: FAMILY MEDICINE | Facility: CLINIC | Age: 40
End: 2025-04-17
Payer: MEDICAID

## 2025-04-17 VITALS
HEIGHT: 62 IN | SYSTOLIC BLOOD PRESSURE: 92 MMHG | HEART RATE: 89 BPM | OXYGEN SATURATION: 98 % | RESPIRATION RATE: 18 BRPM | WEIGHT: 218.13 LBS | DIASTOLIC BLOOD PRESSURE: 60 MMHG | BODY MASS INDEX: 40.14 KG/M2

## 2025-04-17 DIAGNOSIS — E55.9 VITAMIN D DEFICIENCY: ICD-10-CM

## 2025-04-17 DIAGNOSIS — E11.9 TYPE 2 DIABETES MELLITUS WITHOUT COMPLICATION, WITHOUT LONG-TERM CURRENT USE OF INSULIN: ICD-10-CM

## 2025-04-17 DIAGNOSIS — D50.0 IRON DEFICIENCY ANEMIA DUE TO CHRONIC BLOOD LOSS: ICD-10-CM

## 2025-04-17 DIAGNOSIS — E03.9 HYPOTHYROIDISM, UNSPECIFIED TYPE: ICD-10-CM

## 2025-04-17 DIAGNOSIS — D50.9 IRON DEFICIENCY ANEMIA, UNSPECIFIED IRON DEFICIENCY ANEMIA TYPE: ICD-10-CM

## 2025-04-17 DIAGNOSIS — K59.09 OTHER CONSTIPATION: ICD-10-CM

## 2025-04-17 DIAGNOSIS — I10 ESSENTIAL HYPERTENSION: ICD-10-CM

## 2025-04-17 DIAGNOSIS — E66.01 CLASS 3 SEVERE OBESITY WITH SERIOUS COMORBIDITY AND BODY MASS INDEX (BMI) OF 40.0 TO 44.9 IN ADULT, UNSPECIFIED OBESITY TYPE: ICD-10-CM

## 2025-04-17 DIAGNOSIS — E66.813 CLASS 3 SEVERE OBESITY WITH SERIOUS COMORBIDITY AND BODY MASS INDEX (BMI) OF 40.0 TO 44.9 IN ADULT, UNSPECIFIED OBESITY TYPE: ICD-10-CM

## 2025-04-17 DIAGNOSIS — E11.9 TYPE 2 DIABETES MELLITUS WITHOUT COMPLICATION, WITHOUT LONG-TERM CURRENT USE OF INSULIN: Primary | ICD-10-CM

## 2025-04-17 DIAGNOSIS — E78.2 MIXED HYPERLIPIDEMIA: ICD-10-CM

## 2025-04-17 DIAGNOSIS — M54.50 CHRONIC MIDLINE LOW BACK PAIN, UNSPECIFIED WHETHER SCIATICA PRESENT: ICD-10-CM

## 2025-04-17 DIAGNOSIS — G89.29 CHRONIC MIDLINE LOW BACK PAIN, UNSPECIFIED WHETHER SCIATICA PRESENT: ICD-10-CM

## 2025-04-17 LAB
ABSOLUTE EOSINOPHIL (OHS): 0.12 K/UL
ABSOLUTE MONOCYTE (OHS): 0.59 K/UL (ref 0.3–1)
ABSOLUTE NEUTROPHIL COUNT (OHS): 6.26 K/UL (ref 1.8–7.7)
ALBUMIN SERPL BCP-MCNC: 3.2 G/DL (ref 3.5–5.2)
ALBUMIN/CREAT UR: 12.2 UG/MG
ALP SERPL-CCNC: 79 UNIT/L (ref 40–150)
ALT SERPL W/O P-5'-P-CCNC: 19 UNIT/L (ref 10–44)
ANION GAP (OHS): 10 MMOL/L (ref 8–16)
AST SERPL-CCNC: 12 UNIT/L (ref 11–45)
BASOPHILS # BLD AUTO: 0.05 K/UL
BASOPHILS NFR BLD AUTO: 0.6 %
BILIRUB SERPL-MCNC: 0.1 MG/DL (ref 0.1–1)
BUN SERPL-MCNC: 7 MG/DL (ref 6–20)
CALCIUM SERPL-MCNC: 8.8 MG/DL (ref 8.7–10.5)
CHLORIDE SERPL-SCNC: 108 MMOL/L (ref 95–110)
CHOLEST SERPL-MCNC: 164 MG/DL (ref 120–199)
CHOLEST/HDLC SERPL: 4.4 {RATIO} (ref 2–5)
CO2 SERPL-SCNC: 20 MMOL/L (ref 23–29)
CREAT SERPL-MCNC: 0.6 MG/DL (ref 0.5–1.4)
CREAT UR-MCNC: 187.9 MG/DL (ref 15–325)
ERYTHROCYTE [DISTWIDTH] IN BLOOD BY AUTOMATED COUNT: 15.1 % (ref 11.5–14.5)
GFR SERPLBLD CREATININE-BSD FMLA CKD-EPI: >60 ML/MIN/1.73/M2
GLUCOSE SERPL-MCNC: 112 MG/DL (ref 70–110)
HCT VFR BLD AUTO: 35.7 % (ref 37–48.5)
HDLC SERPL-MCNC: 37 MG/DL (ref 40–75)
HDLC SERPL: 22.6 % (ref 20–50)
HGB BLD-MCNC: 10.8 GM/DL (ref 12–16)
IMM GRANULOCYTES # BLD AUTO: 0.05 K/UL (ref 0–0.04)
IMM GRANULOCYTES NFR BLD AUTO: 0.6 % (ref 0–0.5)
LDLC SERPL CALC-MCNC: 99 MG/DL (ref 63–159)
LYMPHOCYTES # BLD AUTO: 2 K/UL (ref 1–4.8)
MCH RBC QN AUTO: 24.8 PG (ref 27–31)
MCHC RBC AUTO-ENTMCNC: 30.3 G/DL (ref 32–36)
MCV RBC AUTO: 82 FL (ref 82–98)
MICROALBUMIN UR-MCNC: 23 UG/ML (ref ?–5000)
NONHDLC SERPL-MCNC: 127 MG/DL
NUCLEATED RBC (/100WBC) (OHS): 0 /100 WBC
PLATELET # BLD AUTO: 475 K/UL (ref 150–450)
PMV BLD AUTO: 9.2 FL (ref 9.2–12.9)
POTASSIUM SERPL-SCNC: 3.5 MMOL/L (ref 3.5–5.1)
PROT SERPL-MCNC: 7.3 GM/DL (ref 6–8.4)
RBC # BLD AUTO: 4.35 M/UL (ref 4–5.4)
RELATIVE EOSINOPHIL (OHS): 1.3 %
RELATIVE LYMPHOCYTE (OHS): 22.1 % (ref 18–48)
RELATIVE MONOCYTE (OHS): 6.5 % (ref 4–15)
RELATIVE NEUTROPHIL (OHS): 68.9 % (ref 38–73)
SODIUM SERPL-SCNC: 138 MMOL/L (ref 136–145)
TRIGL SERPL-MCNC: 140 MG/DL (ref 30–150)
TSH SERPL-ACNC: 1.02 UIU/ML (ref 0.4–4)
WBC # BLD AUTO: 9.07 K/UL (ref 3.9–12.7)

## 2025-04-17 PROCEDURE — 82465 ASSAY BLD/SERUM CHOLESTEROL: CPT

## 2025-04-17 PROCEDURE — 99214 OFFICE O/P EST MOD 30 MIN: CPT | Mod: PBBFAC | Performed by: STUDENT IN AN ORGANIZED HEALTH CARE EDUCATION/TRAINING PROGRAM

## 2025-04-17 PROCEDURE — 99999 PR PBB SHADOW E&M-EST. PATIENT-LVL IV: CPT | Mod: PBBFAC,,, | Performed by: STUDENT IN AN ORGANIZED HEALTH CARE EDUCATION/TRAINING PROGRAM

## 2025-04-17 PROCEDURE — 83540 ASSAY OF IRON: CPT

## 2025-04-17 PROCEDURE — 84443 ASSAY THYROID STIM HORMONE: CPT

## 2025-04-17 PROCEDURE — 85025 COMPLETE CBC W/AUTO DIFF WBC: CPT

## 2025-04-17 PROCEDURE — 82570 ASSAY OF URINE CREATININE: CPT

## 2025-04-17 PROCEDURE — 83036 HEMOGLOBIN GLYCOSYLATED A1C: CPT

## 2025-04-17 PROCEDURE — 82728 ASSAY OF FERRITIN: CPT

## 2025-04-17 PROCEDURE — 36415 COLL VENOUS BLD VENIPUNCTURE: CPT

## 2025-04-17 PROCEDURE — 80053 COMPREHEN METABOLIC PANEL: CPT

## 2025-04-17 RX ORDER — SPIRONOLACTONE 100 MG/1
100 TABLET, FILM COATED ORAL DAILY
Qty: 30 TABLET | Refills: 2 | Status: SHIPPED | OUTPATIENT
Start: 2025-04-17

## 2025-04-17 RX ORDER — METFORMIN HYDROCHLORIDE 500 MG/1
500 TABLET ORAL 2 TIMES DAILY
Qty: 60 TABLET | Refills: 2 | Status: SHIPPED | OUTPATIENT
Start: 2025-04-17

## 2025-04-17 RX ORDER — GABAPENTIN 300 MG/1
300 CAPSULE ORAL 2 TIMES DAILY
Qty: 60 CAPSULE | Refills: 2 | Status: SHIPPED | OUTPATIENT
Start: 2025-04-17

## 2025-04-17 NOTE — PROGRESS NOTES
Subjective:       Patient ID: Ethel Glass is a 39 y.o. female.    Chief Complaint: Follow-up (Pt here for 6 mth f/u. )    Pt here for follow-up, last seen 08/2024. She had multiple no shows since that time.    YURI: She has received IV iron in the past. She also had mild thombocytosis on labs. She is followed by Dr. Wade at [facility], hem/onc.    Constipation: She uses linzess.     DM2.obesity: she is currently on ozempic 0.5mg weekly and metformin 500mg BID. Last A1c 6.1. she did gain 9lb since her last visit.    Vit D def: currently taking a vit D supplement.    HLD: tolerating statin.    GERD: she was on protonix and has been for years. Followed by GI.    HTN: doing well with spironolactone.    Chronic low back pain: on gabapentin.uses flexeril nightly as needed.    She is still followed by mental health. Meds include alprazolam, lexapro, adderall and seroquel     Review of Systems   Constitutional:  Negative for chills and fever.   HENT:  Negative for congestion and sore throat.    Respiratory:  Negative for cough and shortness of breath.    Cardiovascular:  Negative for chest pain.   Gastrointestinal:  Negative for abdominal pain, constipation, diarrhea and vomiting.   Genitourinary:  Negative for dysuria and hematuria.   Musculoskeletal:  Positive for arthralgias.   Neurological:  Negative for dizziness, syncope and light-headedness.       Objective:      Physical Exam  Vitals reviewed.   Constitutional:       General: She is not in acute distress.     Appearance: She is obese.   HENT:      Head: Normocephalic and atraumatic.   Eyes:      Conjunctiva/sclera: Conjunctivae normal.   Cardiovascular:      Rate and Rhythm: Normal rate and regular rhythm.      Heart sounds: Normal heart sounds. No murmur heard.  Pulmonary:      Effort: Pulmonary effort is normal. No respiratory distress.      Breath sounds: Normal breath sounds.   Neurological:      General: No focal deficit present.      Mental Status: She  is alert and oriented to person, place, and time.      Comments:      Psychiatric:         Mood and Affect: Mood normal.         Behavior: Behavior normal.         Assessment:       1. Type 2 diabetes mellitus without complication, without long-term current use of insulin    2. Chronic midline low back pain, unspecified whether sciatica present    3. Mixed hyperlipidemia    4. Essential hypertension    5. Iron deficiency anemia, unspecified iron deficiency anemia type    6. Vitamin D deficiency    7. Class 3 severe obesity with serious comorbidity and body mass index (BMI) of 40.0 to 44.9 in adult, unspecified obesity type    8. Other constipation    9. Hypothyroidism, unspecified type                Plan:           1. Type 2 diabetes mellitus without complication, without long-term current use of insulin  -     CBC Auto Differential; Future; Expected date: 04/17/2025  -     Comprehensive Metabolic Panel; Future; Expected date: 04/17/2025  -     Hemoglobin A1C; Future; Expected date: 04/17/2025  -     Lipid Panel; Future; Expected date: 04/17/2025  -     Microalbumin/Creatinine Ratio, Urine; Future; Expected date: 04/17/2025  -     metFORMIN (GLUCOPHAGE) 500 MG tablet; Take 1 tablet (500 mg total) by mouth 2 (two) times daily.  Dispense: 60 tablet; Refill: 2    2. Chronic midline low back pain, unspecified whether sciatica present  -     gabapentin (NEURONTIN) 300 MG capsule; Take 1 capsule (300 mg total) by mouth 2 (two) times daily.  Dispense: 60 capsule; Refill: 2    3. Mixed hyperlipidemia  -     Lipid Panel; Future; Expected date: 04/17/2025    4. Essential hypertension  -     CBC Auto Differential; Future; Expected date: 04/17/2025  -     Comprehensive Metabolic Panel; Future; Expected date: 04/17/2025  -     spironolactone (ALDACTONE) 100 MG tablet; Take 1 tablet (100 mg total) by mouth once daily.  Dispense: 30 tablet; Refill: 2    5. Iron deficiency anemia, unspecified iron deficiency anemia type  -     CBC  Auto Differential; Future; Expected date: 04/17/2025  -     Comprehensive Metabolic Panel; Future; Expected date: 04/17/2025  -     Iron and TIBC; Future; Expected date: 04/17/2025  -     Ferritin; Future; Expected date: 04/17/2025    6. Vitamin D deficiency  -     CBC Auto Differential; Future; Expected date: 04/17/2025  -     Comprehensive Metabolic Panel; Future; Expected date: 04/17/2025    7. Class 3 severe obesity with serious comorbidity and body mass index (BMI) of 40.0 to 44.9 in adult, unspecified obesity type    8. Other constipation    9. Hypothyroidism, unspecified type  -     TSH; Future; Expected date: 04/17/2025      Labs as ordered  Cont current meds, refills sent  Follow-up psych as scheduled  Follow-up Dr. Wade, hem/onc, as scheduled   RTC 3 months or sooner if needed

## 2025-04-18 LAB
EAG (OHS): 123 MG/DL (ref 68–131)
FERRITIN SERPL-MCNC: 324 NG/ML (ref 20–300)
HBA1C MFR BLD: 5.9 % (ref 4–5.6)
IRON SATN MFR SERPL: 9 % (ref 20–50)
IRON SERPL-MCNC: 36 UG/DL (ref 30–160)
TIBC SERPL-MCNC: 388 UG/DL (ref 250–450)
TRANSFERRIN SERPL-MCNC: 262 MG/DL (ref 200–375)

## 2025-04-19 DIAGNOSIS — G89.29 CHRONIC MIDLINE LOW BACK PAIN, UNSPECIFIED WHETHER SCIATICA PRESENT: ICD-10-CM

## 2025-04-19 DIAGNOSIS — M54.50 CHRONIC MIDLINE LOW BACK PAIN, UNSPECIFIED WHETHER SCIATICA PRESENT: ICD-10-CM

## 2025-04-19 RX ORDER — CYCLOBENZAPRINE HCL 10 MG
10 TABLET ORAL NIGHTLY
Qty: 15 TABLET | Refills: 2 | OUTPATIENT
Start: 2025-04-19

## 2025-04-21 DIAGNOSIS — E03.9 HYPOTHYROIDISM, UNSPECIFIED TYPE: ICD-10-CM

## 2025-04-21 RX ORDER — LEVOTHYROXINE SODIUM 25 UG/1
25 TABLET ORAL
Qty: 30 TABLET | Refills: 2 | OUTPATIENT
Start: 2025-04-21

## 2025-04-28 ENCOUNTER — RESULTS FOLLOW-UP (OUTPATIENT)
Dept: FAMILY MEDICINE | Facility: CLINIC | Age: 40
End: 2025-04-28

## 2025-04-30 ENCOUNTER — TELEPHONE (OUTPATIENT)
Dept: FAMILY MEDICINE | Facility: CLINIC | Age: 40
End: 2025-04-30
Payer: MEDICAID

## 2025-04-30 DIAGNOSIS — D50.9 IRON DEFICIENCY ANEMIA, UNSPECIFIED IRON DEFICIENCY ANEMIA TYPE: Primary | ICD-10-CM

## 2025-04-30 RX ORDER — FERROUS SULFATE 325(65) MG
325 TABLET ORAL
Qty: 30 TABLET | Refills: 5 | Status: SHIPPED | OUTPATIENT
Start: 2025-04-30

## 2025-04-30 NOTE — TELEPHONE ENCOUNTER
Lm in regard to dr marina's recommendations no infusions needed at this time on script oral iron needed.

## 2025-04-30 NOTE — TELEPHONE ENCOUNTER
----- Message from Raad Orta MD sent at 4/28/2025  9:21 PM CDT -----  Please inform the pt recent labs stable. Iron is low, has she been taking oral iron supplement?  ----- Message -----  From: Lab, Background User  Sent: 4/17/2025  12:44 PM CDT  To: Raad Orta MD

## 2025-05-05 ENCOUNTER — TELEPHONE (OUTPATIENT)
Dept: FAMILY MEDICINE | Facility: CLINIC | Age: 40
End: 2025-05-05
Payer: MEDICAID

## 2025-05-05 DIAGNOSIS — E11.9 TYPE 2 DIABETES MELLITUS WITHOUT COMPLICATION, WITHOUT LONG-TERM CURRENT USE OF INSULIN: Primary | ICD-10-CM

## 2025-05-05 NOTE — TELEPHONE ENCOUNTER
Patient asking about the prescription for ozempic, states the pharmacy never received it.        Please advise

## 2025-05-06 RX ORDER — SEMAGLUTIDE 1.34 MG/ML
1 INJECTION, SOLUTION SUBCUTANEOUS
Qty: 3 ML | Refills: 3 | Status: SHIPPED | OUTPATIENT
Start: 2025-05-06 | End: 2025-08-26

## 2025-05-28 DIAGNOSIS — K59.09 OTHER CONSTIPATION: ICD-10-CM

## 2025-05-28 DIAGNOSIS — G89.29 CHRONIC MIDLINE LOW BACK PAIN, UNSPECIFIED WHETHER SCIATICA PRESENT: ICD-10-CM

## 2025-05-28 DIAGNOSIS — M54.50 CHRONIC MIDLINE LOW BACK PAIN, UNSPECIFIED WHETHER SCIATICA PRESENT: ICD-10-CM

## 2025-05-28 DIAGNOSIS — K21.9 GASTROESOPHAGEAL REFLUX DISEASE, UNSPECIFIED WHETHER ESOPHAGITIS PRESENT: ICD-10-CM

## 2025-05-28 NOTE — TELEPHONE ENCOUNTER
Care Due:                  Date            Visit Type   Department     Provider  --------------------------------------------------------------------------------                                EP -                              PRIMARY      [facility]  Last Visit: 04-      CARE ([facility])   MEDICINE       Raad Orta                              MYCHART                              FOLLOWUP/OF  [facility]  Next Visit: 06-      FICE VISIT   MEDICINE       Raad Orta                                                            Last  Test          Frequency    Reason                     Performed    Due Date  --------------------------------------------------------------------------------    Vitamin D...  12 months..  cholecalciferol,.........  03- 02-    Health [facility] Embedded Care Due Messages. Reference number: [id number].   5/28/2025 6:24:49 PM CDT

## 2025-05-29 RX ORDER — ALBUTEROL SULFATE 0.83 MG/ML
2.5 SOLUTION RESPIRATORY (INHALATION) EVERY 6 HOURS PRN
Qty: 30 ML | Refills: 0 | Status: SHIPPED | OUTPATIENT
Start: 2025-05-29

## 2025-05-29 RX ORDER — GABAPENTIN 300 MG/1
300 CAPSULE ORAL 2 TIMES DAILY
Qty: 60 CAPSULE | Refills: 0 | Status: SHIPPED | OUTPATIENT
Start: 2025-05-29

## 2025-05-29 RX ORDER — PANTOPRAZOLE SODIUM 40 MG/1
40 TABLET, DELAYED RELEASE ORAL DAILY
Qty: 90 TABLET | Refills: 0 | Status: SHIPPED | OUTPATIENT
Start: 2025-05-29

## 2025-06-28 ENCOUNTER — PATIENT MESSAGE (OUTPATIENT)
Dept: ADMINISTRATIVE | Facility: OTHER | Age: 40
End: 2025-06-28
Payer: MEDICAID

## 2025-07-21 DIAGNOSIS — K21.9 GASTROESOPHAGEAL REFLUX DISEASE, UNSPECIFIED WHETHER ESOPHAGITIS PRESENT: ICD-10-CM

## 2025-07-21 RX ORDER — PANTOPRAZOLE SODIUM 40 MG/1
40 TABLET, DELAYED RELEASE ORAL
Qty: 90 TABLET | Refills: 0 | OUTPATIENT
Start: 2025-07-21

## 2025-07-21 RX ORDER — ALBUTEROL SULFATE 0.83 MG/ML
SOLUTION RESPIRATORY (INHALATION)
Qty: 75 ML | OUTPATIENT
Start: 2025-07-21

## 2025-09-03 DIAGNOSIS — K21.9 GASTROESOPHAGEAL REFLUX DISEASE, UNSPECIFIED WHETHER ESOPHAGITIS PRESENT: ICD-10-CM

## 2025-09-03 RX ORDER — PANTOPRAZOLE SODIUM 40 MG/1
40 TABLET, DELAYED RELEASE ORAL
Qty: 90 TABLET | Refills: 0 | OUTPATIENT
Start: 2025-09-03